# Patient Record
Sex: FEMALE | Race: WHITE | Employment: OTHER | URBAN - METROPOLITAN AREA
[De-identification: names, ages, dates, MRNs, and addresses within clinical notes are randomized per-mention and may not be internally consistent; named-entity substitution may affect disease eponyms.]

---

## 2017-01-04 ENCOUNTER — HOSPITAL ENCOUNTER (OUTPATIENT)
Dept: GENERAL RADIOLOGY | Age: 82
Discharge: HOME | End: 2017-01-04
Attending: FAMILY MEDICINE
Payer: MEDICARE

## 2017-01-04 ENCOUNTER — OFFICE VISIT (OUTPATIENT)
Dept: FAMILY MEDICINE CLINIC | Facility: CLINIC | Age: 82
End: 2017-01-04

## 2017-01-04 VITALS
HEART RATE: 72 BPM | DIASTOLIC BLOOD PRESSURE: 64 MMHG | RESPIRATION RATE: 20 BRPM | BODY MASS INDEX: 32.6 KG/M2 | HEIGHT: 63 IN | TEMPERATURE: 98 F | OXYGEN SATURATION: 96 % | WEIGHT: 184 LBS | SYSTOLIC BLOOD PRESSURE: 100 MMHG

## 2017-01-04 DIAGNOSIS — J45.901 ASTHMA EXACERBATION: ICD-10-CM

## 2017-01-04 DIAGNOSIS — R05.9 COUGH: Primary | ICD-10-CM

## 2017-01-04 DIAGNOSIS — H69.83 EUSTACHIAN TUBE DYSFUNCTION, BILATERAL: ICD-10-CM

## 2017-01-04 DIAGNOSIS — R05.9 COUGH: ICD-10-CM

## 2017-01-04 PROCEDURE — 99214 OFFICE O/P EST MOD 30 MIN: CPT | Performed by: FAMILY MEDICINE

## 2017-01-04 PROCEDURE — 71020 XR CHEST PA + LAT CHEST (CPT=71020): CPT

## 2017-01-04 PROCEDURE — 94640 AIRWAY INHALATION TREATMENT: CPT | Performed by: FAMILY MEDICINE

## 2017-01-04 RX ORDER — AZITHROMYCIN 250 MG/1
TABLET, FILM COATED ORAL
Qty: 6 TABLET | Refills: 0 | Status: SHIPPED | OUTPATIENT
Start: 2017-01-04 | End: 2017-01-09

## 2017-01-04 RX ORDER — PREDNISONE 20 MG/1
40 TABLET ORAL DAILY
Qty: 12 TABLET | Refills: 0 | Status: SHIPPED | OUTPATIENT
Start: 2017-01-04 | End: 2017-01-10

## 2017-01-04 RX ORDER — ALBUTEROL SULFATE 2.5 MG/3ML
2.5 SOLUTION RESPIRATORY (INHALATION) ONCE
Status: COMPLETED | OUTPATIENT
Start: 2017-01-04 | End: 2017-01-04

## 2017-01-04 RX ADMIN — ALBUTEROL SULFATE 2.5 MG: 2.5 SOLUTION RESPIRATORY (INHALATION) at 16:58:00

## 2017-01-04 NOTE — PATIENT INSTRUCTIONS
Start antibiotic today per directions. Start prednisone 2 tablets once a day tomorrow morning. Albuterol inhaler 2 puffs to 3 times per day and as needed for wheezing cough shortness of breath.   Take probiotic over-the-counter daily like organic yogurt w

## 2017-01-04 NOTE — PROGRESS NOTES
Nicola Cantu is a 80year old female. cc cough, congestion  HPI:   Patient is coming for evaluation of the cough which started 2 weeks ago. She has a lot of rumbling in the chest.  She also has nasal congestion runny nose.   Patient denies herself any pr ASPIRIN 81 mg by Does not apply route.  Disp:  Rfl:    FISH OIL 1 TABLET DAILY  Disp:  Rfl:       Past Medical History   Diagnosis Date   • Osteoporosis 2006     2 fractures in lumbar spine, bmd 2006   • Asthma      copd   • Personal history of malignant saturation increased to 96%.   CARDIO: RRR without murmur  GI: good BS's,no masses, HSM or tenderness  EXTREMITIES: no cyanosis, clubbing or edema  Psychiatric - alert  and oriented x3, normal mood     ASSESSMENT AND PLAN:   Cough  (primary encounter diagno

## 2017-01-09 RX ORDER — MEMANTINE HYDROCHLORIDE 10 MG/1
TABLET ORAL
Qty: 180 TABLET | Refills: 0 | Status: SHIPPED | OUTPATIENT
Start: 2017-01-09 | End: 2017-04-09

## 2017-01-16 NOTE — PROGRESS NOTES
Sam Tavarez is a 80year old female. cc asthma exacerbation follow-up, dementia   HPI:   patient is coming for follow-up of asthma exacerbation she is doing much better. She is using her inhalers regularly. No wheezing no shortness of breath.   Her hus 2006     2 fractures in lumbar spine, bmd 2006   • Asthma      copd   • Personal history of malignant neoplasm of breast 1991     on hrt, s/p mastectomy left, chemo   • Uterine prolapse without mention of vaginal wall prolapse      wearing pesary   • Histo of the defined types were placed in this encounter. Meds & Refills for this Visit:  Signed Prescriptions Disp Refills    Donepezil HCl 23 MG Oral Tab 90 tablet 1      Sig: Take 1 tablet (23 mg total) by mouth daily. Continue current meds.    Natalie Farr

## 2017-01-16 NOTE — PATIENT INSTRUCTIONS
Continue current meds. Watch diet for fats and carbs. Stay active. Continue inhalers. Monitor symptoms. Keep good hydration.

## 2017-03-20 ENCOUNTER — OFFICE VISIT (OUTPATIENT)
Dept: FAMILY MEDICINE CLINIC | Facility: CLINIC | Age: 82
End: 2017-03-20

## 2017-03-20 VITALS
OXYGEN SATURATION: 94 % | TEMPERATURE: 97 F | HEIGHT: 63 IN | DIASTOLIC BLOOD PRESSURE: 50 MMHG | HEART RATE: 110 BPM | BODY MASS INDEX: 32.07 KG/M2 | RESPIRATION RATE: 20 BRPM | SYSTOLIC BLOOD PRESSURE: 86 MMHG | WEIGHT: 181 LBS

## 2017-03-20 DIAGNOSIS — J45.30 MILD PERSISTENT ASTHMA WITHOUT COMPLICATION: ICD-10-CM

## 2017-03-20 DIAGNOSIS — M81.0 OSTEOPOROSIS: ICD-10-CM

## 2017-03-20 DIAGNOSIS — Z12.31 ENCOUNTER FOR SCREENING MAMMOGRAM FOR BREAST CANCER: ICD-10-CM

## 2017-03-20 DIAGNOSIS — M25.562 ACUTE PAIN OF LEFT KNEE: ICD-10-CM

## 2017-03-20 DIAGNOSIS — Z00.00 ENCOUNTER FOR ANNUAL HEALTH EXAMINATION: Primary | ICD-10-CM

## 2017-03-20 DIAGNOSIS — F02.80 LATE ONSET ALZHEIMER'S DISEASE WITHOUT BEHAVIORAL DISTURBANCE (HCC): ICD-10-CM

## 2017-03-20 DIAGNOSIS — R39.15 URINARY URGENCY: ICD-10-CM

## 2017-03-20 DIAGNOSIS — E55.9 VITAMIN D DEFICIENCY: ICD-10-CM

## 2017-03-20 DIAGNOSIS — G30.1 LATE ONSET ALZHEIMER'S DISEASE WITHOUT BEHAVIORAL DISTURBANCE (HCC): ICD-10-CM

## 2017-03-20 DIAGNOSIS — I95.0 IDIOPATHIC HYPOTENSION: ICD-10-CM

## 2017-03-20 DIAGNOSIS — R53.1 GENERAL WEAKNESS: ICD-10-CM

## 2017-03-20 DIAGNOSIS — E78.00 PURE HYPERCHOLESTEROLEMIA: ICD-10-CM

## 2017-03-20 PROCEDURE — 99214 OFFICE O/P EST MOD 30 MIN: CPT | Performed by: FAMILY MEDICINE

## 2017-03-20 PROCEDURE — G0444 DEPRESSION SCREEN ANNUAL: HCPCS | Performed by: FAMILY MEDICINE

## 2017-03-20 PROCEDURE — G0439 PPPS, SUBSEQ VISIT: HCPCS | Performed by: FAMILY MEDICINE

## 2017-03-20 RX ORDER — PREDNISONE 20 MG/1
20 TABLET ORAL DAILY
Qty: 5 TABLET | Refills: 0 | Status: SHIPPED | OUTPATIENT
Start: 2017-03-20 | End: 2017-03-25

## 2017-03-20 NOTE — PROGRESS NOTES
HPI:   Joseph Carver is a 80year old female who presents for a Medicare Subsequent Annual Wellness visit (Pt already had Initial Annual Wellness) also follow-up on asthma vitamin D deficiency hyperlipidemia,  discuss her generalized weakness and Alzhe retina     Urgency of urination     Asthma     Unspecified urinary incontinence     Osteoarthrosis, unspecified whether generalized or localized, involving lower leg     Hyperlipidemia     Low BP     Memory loss     Alzheimer's disease     Nonspecific abno needed for Wheezing. EYE-TRINITY PLUS LUTEIN OR 1 TABLET DAILY    CITRACAL + D OR BID      MEDICAL INFORMATION:   She  has a past medical history of Osteoporosis (2006); Asthma; Personal history of malignant neoplasm of breast (1991);  Uterine prolapse witho lb.    Medicare Hearing Assessment  (Required for AWV/SWV)    Finger Rub - normal      Visual Acuity                           General Appearance:  Alert, cooperative, no distress, appears stated age   Head:  Normocephalic, without obvious abnormality, atr examination    Osteoporosis  -     DEXA BONE DENSITY SCAN, ROUTINE (CPT=34569); Future    Vitamin D deficiency  -     VITAMIN D, 25-HYDROXY; Future    Pure hypercholesterolemia  -     COMP METABOLIC PANEL; Future  -     LIPID PANEL;  Future    Late onset Al female age 47-67, do you take aspirin?: Yes    Have you had any immunizations at another office such as Influenza, Hepatitis B, Tetanus, or Pneumococcal?: No     Functional Ability     Bathing or Showering: Able without help    Toileting: Able without help it?: Correct    What year is it?: Correct                      This section provided for quick review of chart, separate sheet to patient  [address] Internal Lab or Procedure External Lab or Procedure   Diabetes Screen performed in visit on 09/12/16  -FLU VACC PRSV FREE INC ANTIG   Orders placed or performed in visit on 09/09/13  -INFLUENZA VIRUS VACCINE, PRESERV FREE, >=1YEARS OF AGE   Orders placed or performed in visit on 09/26/12  -INFLUENZA VIRUS VACCINE, PRESERV F Spirometry Testing Annually Spirometry date:  No flowsheet data found.          Template: MELITON JEFFREY MEDICARE ANNUAL ASSESSMENT FEMALE [21803]

## 2017-03-20 NOTE — PATIENT INSTRUCTIONS
Continue current meds. Watch diet for fats and carbs. Stay active. Schedule physical therapy. Return for fasting blood work. Call [phone] to schedule Mammogram and DEXA scan. Follow-up med check in September 2017.       [facility] screening covered service except at the Welcome to Medicare Visit    Abdominal aortic aneurysm screening (once between ages 73-68) IPPE only No results found for this or any previous visit.  Limited to patients who meet one of the following criteria:   • Me flowsheet data found.     Screening Mammogram      Mammogram    Recommend Annually to at least age 76, and as needed after 76 Mammogram,1 Yr due on 10/05/2016 Please get this Mammogram regularly   Immunizations      Influenza  Covered Annually   Orders plac website. http://[email or web address] [place].state. [place]/public/books/advin.htm  A link to the [employer]. This site has a lot of good information including definitions of the different types of Advance Directives.  It also has the [facility] forms available

## 2017-03-21 ENCOUNTER — APPOINTMENT (OUTPATIENT)
Dept: LAB | Age: 82
End: 2017-03-21
Attending: FAMILY MEDICINE
Payer: MEDICARE

## 2017-03-22 ENCOUNTER — APPOINTMENT (OUTPATIENT)
Dept: LAB | Age: 82
End: 2017-03-22
Attending: FAMILY MEDICINE
Payer: MEDICARE

## 2017-03-22 DIAGNOSIS — R39.15 URINARY URGENCY: ICD-10-CM

## 2017-03-22 PROCEDURE — 87086 URINE CULTURE/COLONY COUNT: CPT

## 2017-03-22 PROCEDURE — 87186 SC STD MICRODIL/AGAR DIL: CPT

## 2017-03-22 PROCEDURE — 87077 CULTURE AEROBIC IDENTIFY: CPT

## 2017-03-24 ENCOUNTER — APPOINTMENT (OUTPATIENT)
Dept: LAB | Age: 82
End: 2017-03-24
Attending: FAMILY MEDICINE
Payer: MEDICARE

## 2017-03-24 DIAGNOSIS — E55.9 VITAMIN D DEFICIENCY: ICD-10-CM

## 2017-03-24 DIAGNOSIS — E78.00 PURE HYPERCHOLESTEROLEMIA: ICD-10-CM

## 2017-03-24 LAB
25-HYDROXYVITAMIN D (TOTAL): 29.6 NG/ML (ref 30–100)
ALBUMIN SERPL-MCNC: 3.6 G/DL (ref 3.5–4.8)
ALP LIVER SERPL-CCNC: 57 U/L (ref 55–142)
ALT SERPL-CCNC: 21 U/L (ref 14–54)
AST SERPL-CCNC: 14 U/L (ref 15–41)
BILIRUB SERPL-MCNC: 0.4 MG/DL (ref 0.1–2)
BUN BLD-MCNC: 21 MG/DL (ref 8–20)
CALCIUM BLD-MCNC: 9.5 MG/DL (ref 8.3–10.3)
CHLORIDE: 106 MMOL/L (ref 101–111)
CHOLEST SMN-MCNC: 171 MG/DL (ref ?–200)
CO2: 28 MMOL/L (ref 22–32)
CREAT BLD-MCNC: 0.99 MG/DL (ref 0.55–1.02)
GLUCOSE BLD-MCNC: 75 MG/DL (ref 70–99)
HDLC SERPL-MCNC: 102 MG/DL (ref 45–?)
HDLC SERPL: 1.68 {RATIO} (ref ?–4.44)
LDLC SERPL CALC-MCNC: 51 MG/DL (ref ?–130)
M PROTEIN MFR SERPL ELPH: 7.1 G/DL (ref 6.1–8.3)
NONHDLC SERPL-MCNC: 69 MG/DL (ref ?–130)
POTASSIUM SERPL-SCNC: 4.1 MMOL/L (ref 3.6–5.1)
SODIUM SERPL-SCNC: 142 MMOL/L (ref 136–144)
TRIGLYCERIDES: 88 MG/DL (ref ?–150)
VLDL: 18 MG/DL (ref 5–40)

## 2017-03-24 PROCEDURE — 82306 VITAMIN D 25 HYDROXY: CPT

## 2017-03-24 PROCEDURE — 36415 COLL VENOUS BLD VENIPUNCTURE: CPT

## 2017-03-24 PROCEDURE — 80061 LIPID PANEL: CPT

## 2017-03-24 PROCEDURE — 80053 COMPREHEN METABOLIC PANEL: CPT

## 2017-03-27 ENCOUNTER — HOSPITAL ENCOUNTER (OUTPATIENT)
Dept: BONE DENSITY | Age: 82
Discharge: HOME | End: 2017-03-27
Attending: FAMILY MEDICINE
Payer: MEDICARE

## 2017-03-27 ENCOUNTER — HOSPITAL ENCOUNTER (OUTPATIENT)
Dept: MAMMOGRAPHY | Age: 82
Discharge: HOME | End: 2017-03-27
Attending: FAMILY MEDICINE
Payer: MEDICARE

## 2017-03-27 DIAGNOSIS — Z12.31 ENCOUNTER FOR SCREENING MAMMOGRAM FOR BREAST CANCER: ICD-10-CM

## 2017-03-27 DIAGNOSIS — M81.0 OSTEOPOROSIS: ICD-10-CM

## 2017-03-27 PROCEDURE — 77080 DXA BONE DENSITY AXIAL: CPT

## 2017-03-27 PROCEDURE — 77067 SCR MAMMO BI INCL CAD: CPT

## 2017-03-27 RX ORDER — SIMVASTATIN 20 MG
TABLET ORAL
Qty: 90 TABLET | Refills: 0 | Status: SHIPPED | OUTPATIENT
Start: 2017-03-27 | End: 2017-06-23

## 2017-03-27 RX ORDER — CEPHALEXIN 500 MG/1
TABLET ORAL
Qty: 21 TABLET | Refills: 0 | Status: SHIPPED | OUTPATIENT
Start: 2017-03-27 | End: 2017-09-20 | Stop reason: ALTCHOICE

## 2017-03-27 RX ORDER — SIMVASTATIN 20 MG
TABLET ORAL
Qty: 90 TABLET | Refills: 0 | Status: SHIPPED | OUTPATIENT
Start: 2017-03-27 | End: 2018-04-25

## 2017-04-03 RX ORDER — MONTELUKAST SODIUM 10 MG/1
TABLET ORAL
Qty: 90 TABLET | Refills: 0 | Status: SHIPPED | OUTPATIENT
Start: 2017-04-03 | End: 2017-06-23

## 2017-04-07 ENCOUNTER — MED REC SCAN ONLY (OUTPATIENT)
Dept: FAMILY MEDICINE CLINIC | Facility: CLINIC | Age: 82
End: 2017-04-07

## 2017-04-11 RX ORDER — MEMANTINE HYDROCHLORIDE 10 MG/1
TABLET ORAL
Qty: 180 TABLET | Refills: 0 | Status: SHIPPED | OUTPATIENT
Start: 2017-04-11 | End: 2017-07-14

## 2017-04-11 NOTE — TELEPHONE ENCOUNTER
Not a protocol medication last OV 3/20/17 last refill 1/9/17, please review and refill if appropriate

## 2017-06-28 RX ORDER — MONTELUKAST SODIUM 10 MG/1
TABLET ORAL
Qty: 90 TABLET | Refills: 0 | Status: SHIPPED | OUTPATIENT
Start: 2017-06-28 | End: 2017-09-15

## 2017-06-28 RX ORDER — SIMVASTATIN 20 MG
TABLET ORAL
Qty: 90 TABLET | Refills: 0 | Status: SHIPPED | OUTPATIENT
Start: 2017-06-28 | End: 2017-09-20

## 2017-07-18 RX ORDER — MEMANTINE HYDROCHLORIDE 10 MG/1
TABLET ORAL
Qty: 180 TABLET | Refills: 0 | Status: SHIPPED | OUTPATIENT
Start: 2017-07-18 | End: 2017-10-20

## 2017-08-05 DIAGNOSIS — G30.1 LATE ONSET ALZHEIMER'S DISEASE WITHOUT BEHAVIORAL DISTURBANCE (HCC): ICD-10-CM

## 2017-08-05 DIAGNOSIS — F02.80 LATE ONSET ALZHEIMER'S DISEASE WITHOUT BEHAVIORAL DISTURBANCE (HCC): ICD-10-CM

## 2017-08-05 RX ORDER — DONEPEZIL HYDROCHLORIDE 23 MG/1
TABLET, FILM COATED ORAL
Qty: 90 TABLET | Refills: 1 | Status: SHIPPED | OUTPATIENT
Start: 2017-08-05 | End: 2018-02-06

## 2017-09-18 RX ORDER — MONTELUKAST SODIUM 10 MG/1
TABLET ORAL
Qty: 90 TABLET | Refills: 0 | Status: SHIPPED | OUTPATIENT
Start: 2017-09-18 | End: 2017-12-28

## 2017-09-20 ENCOUNTER — OFFICE VISIT (OUTPATIENT)
Dept: FAMILY MEDICINE CLINIC | Facility: CLINIC | Age: 82
End: 2017-09-20

## 2017-09-20 VITALS
TEMPERATURE: 97 F | RESPIRATION RATE: 18 BRPM | SYSTOLIC BLOOD PRESSURE: 110 MMHG | WEIGHT: 179 LBS | HEART RATE: 85 BPM | DIASTOLIC BLOOD PRESSURE: 62 MMHG | BODY MASS INDEX: 31.71 KG/M2 | HEIGHT: 63 IN | OXYGEN SATURATION: 97 %

## 2017-09-20 DIAGNOSIS — F02.80 LATE ONSET ALZHEIMER'S DISEASE WITHOUT BEHAVIORAL DISTURBANCE (HCC): ICD-10-CM

## 2017-09-20 DIAGNOSIS — G30.1 LATE ONSET ALZHEIMER'S DISEASE WITHOUT BEHAVIORAL DISTURBANCE (HCC): ICD-10-CM

## 2017-09-20 DIAGNOSIS — R82.90 ABNORMAL URINE ODOR: ICD-10-CM

## 2017-09-20 DIAGNOSIS — E55.9 VITAMIN D DEFICIENCY: ICD-10-CM

## 2017-09-20 DIAGNOSIS — R53.1 GENERALIZED WEAKNESS: ICD-10-CM

## 2017-09-20 DIAGNOSIS — J45.30 MILD PERSISTENT ASTHMA WITHOUT COMPLICATION: ICD-10-CM

## 2017-09-20 DIAGNOSIS — R53.83 FATIGUE, UNSPECIFIED TYPE: ICD-10-CM

## 2017-09-20 DIAGNOSIS — E78.00 PURE HYPERCHOLESTEROLEMIA: Primary | ICD-10-CM

## 2017-09-20 PROCEDURE — 99214 OFFICE O/P EST MOD 30 MIN: CPT | Performed by: FAMILY MEDICINE

## 2017-09-20 PROCEDURE — G0008 ADMIN INFLUENZA VIRUS VAC: HCPCS | Performed by: FAMILY MEDICINE

## 2017-09-20 PROCEDURE — 90653 IIV ADJUVANT VACCINE IM: CPT | Performed by: FAMILY MEDICINE

## 2017-09-20 NOTE — PROGRESS NOTES
Jeanie Fabry is a 80year old female. cc hyperlipidemia, asthma, allergic rhinitis, generalized weakness, urinary odor, obese. Dementia ,   HPI:   Hyperlipidemia on medication doing well.  is trying to limit what she eats.   Her appetite is very Soln Inhale 2 puffs into the lungs every 4 (four) hours as needed for Wheezing. Disp:  Rfl:    loratadine (CLARITIN) 10 MG Oral Tab Take 10 mg by mouth daily. Disp:  Rfl:    ASPIRIN 81 mg by Does not apply route.  Disp:  Rfl:    FISH OIL 1 TABLET DAILY  Dis supple,no adenopathy,  LUNGS: clear to auscultation  CARDIO: RRR without murmur  GI: good BS's,no masses, HSM or tenderness  EXTREMITIES: no cyanosis, clubbing or edema  Psychiatric - alert  and oriented to herself, normal mood     ASSESSMENT AND PLAN:   P

## 2017-09-20 NOTE — PATIENT INSTRUCTIONS
Continue current medications. Use facial moisturizer like Aveeno. Return for fasting blood work. Schedule  physical therapy. Healthy diet. Keep good hydration. Schedule Medicare wellness visit after March 20, 2018. Follow-up sooner if needed .

## 2017-09-21 ENCOUNTER — APPOINTMENT (OUTPATIENT)
Dept: LAB | Age: 82
End: 2017-09-21
Attending: FAMILY MEDICINE
Payer: MEDICARE

## 2017-09-21 DIAGNOSIS — R82.90 ABNORMAL URINE ODOR: ICD-10-CM

## 2017-09-21 PROCEDURE — 87086 URINE CULTURE/COLONY COUNT: CPT

## 2017-09-25 RX ORDER — SIMVASTATIN 20 MG
TABLET ORAL
Qty: 90 TABLET | Refills: 0 | Status: SHIPPED | OUTPATIENT
Start: 2017-09-25 | End: 2017-12-22

## 2017-09-26 ENCOUNTER — LABORATORY ENCOUNTER (OUTPATIENT)
Dept: LAB | Age: 82
End: 2017-09-26
Attending: FAMILY MEDICINE
Payer: MEDICARE

## 2017-09-26 DIAGNOSIS — E55.9 VITAMIN D DEFICIENCY: ICD-10-CM

## 2017-09-26 DIAGNOSIS — R53.83 FATIGUE, UNSPECIFIED TYPE: ICD-10-CM

## 2017-09-26 DIAGNOSIS — E78.00 PURE HYPERCHOLESTEROLEMIA: ICD-10-CM

## 2017-09-26 LAB
25-HYDROXYVITAMIN D (TOTAL): 26.3 NG/ML (ref 30–100)
ALBUMIN SERPL-MCNC: 3.5 G/DL (ref 3.5–4.8)
ALP LIVER SERPL-CCNC: 65 U/L (ref 55–142)
ALT SERPL-CCNC: 22 U/L (ref 14–54)
AST SERPL-CCNC: 14 U/L (ref 15–41)
BASOPHILS # BLD AUTO: 0.06 X10(3) UL (ref 0–0.1)
BASOPHILS NFR BLD AUTO: 0.8 %
BILIRUB SERPL-MCNC: 0.6 MG/DL (ref 0.1–2)
BUN BLD-MCNC: 22 MG/DL (ref 8–20)
CALCIUM BLD-MCNC: 9.4 MG/DL (ref 8.3–10.3)
CHLORIDE: 108 MMOL/L (ref 101–111)
CHOLEST SMN-MCNC: 175 MG/DL (ref ?–200)
CO2: 28 MMOL/L (ref 22–32)
CREAT BLD-MCNC: 0.94 MG/DL (ref 0.55–1.02)
EOSINOPHIL # BLD AUTO: 1.23 X10(3) UL (ref 0–0.3)
EOSINOPHIL NFR BLD AUTO: 16.3 %
ERYTHROCYTE [DISTWIDTH] IN BLOOD BY AUTOMATED COUNT: 14.7 % (ref 11.5–16)
GLUCOSE BLD-MCNC: 77 MG/DL (ref 70–99)
HCT VFR BLD AUTO: 41.6 % (ref 34–50)
HDLC SERPL-MCNC: 108 MG/DL (ref 45–?)
HDLC SERPL: 1.62 {RATIO} (ref ?–4.44)
HGB BLD-MCNC: 13.1 G/DL (ref 12–16)
IMMATURE GRANULOCYTE COUNT: 0.01 X10(3) UL (ref 0–1)
IMMATURE GRANULOCYTE RATIO %: 0.1 %
LDLC SERPL CALC-MCNC: 49 MG/DL (ref ?–130)
LDLC SERPL-MCNC: 18 MG/DL (ref 5–40)
LYMPHOCYTES # BLD AUTO: 3 X10(3) UL (ref 0.9–4)
LYMPHOCYTES NFR BLD AUTO: 39.7 %
M PROTEIN MFR SERPL ELPH: 7.4 G/DL (ref 6.1–8.3)
MCH RBC QN AUTO: 29.9 PG (ref 27–33.2)
MCHC RBC AUTO-ENTMCNC: 31.5 G/DL (ref 31–37)
MCV RBC AUTO: 95 FL (ref 81–100)
MONOCYTES # BLD AUTO: 0.81 X10(3) UL (ref 0.1–0.6)
MONOCYTES NFR BLD AUTO: 10.7 %
NEUTROPHIL ABS PRELIM: 2.44 X10 (3) UL (ref 1.3–6.7)
NEUTROPHILS # BLD AUTO: 2.44 X10(3) UL (ref 1.3–6.7)
NEUTROPHILS NFR BLD AUTO: 32.4 %
NONHDLC SERPL-MCNC: 67 MG/DL (ref ?–130)
PLATELET # BLD AUTO: 157 10(3)UL (ref 150–450)
POTASSIUM SERPL-SCNC: 3.9 MMOL/L (ref 3.6–5.1)
RBC # BLD AUTO: 4.38 X10(6)UL (ref 3.8–5.1)
RED CELL DISTRIBUTION WIDTH-SD: 51.4 FL (ref 35.1–46.3)
SODIUM SERPL-SCNC: 144 MMOL/L (ref 136–144)
TRIGLYCERIDES: 90 MG/DL (ref ?–150)
TSI SER-ACNC: 1.47 MIU/ML (ref 0.35–5.5)
WBC # BLD AUTO: 7.6 X10(3) UL (ref 4–13)

## 2017-09-26 PROCEDURE — 80061 LIPID PANEL: CPT

## 2017-09-26 PROCEDURE — 84443 ASSAY THYROID STIM HORMONE: CPT

## 2017-09-26 PROCEDURE — 85025 COMPLETE CBC W/AUTO DIFF WBC: CPT

## 2017-09-26 PROCEDURE — 36415 COLL VENOUS BLD VENIPUNCTURE: CPT

## 2017-09-26 PROCEDURE — 80053 COMPREHEN METABOLIC PANEL: CPT

## 2017-09-26 PROCEDURE — 82306 VITAMIN D 25 HYDROXY: CPT

## 2017-09-27 DIAGNOSIS — E78.00 PURE HYPERCHOLESTEROLEMIA: Primary | ICD-10-CM

## 2017-09-27 DIAGNOSIS — R73.9 HYPERGLYCEMIA: ICD-10-CM

## 2017-09-27 DIAGNOSIS — E55.9 VITAMIN D DEFICIENCY: ICD-10-CM

## 2017-10-20 RX ORDER — MEMANTINE HYDROCHLORIDE 10 MG/1
TABLET ORAL
Qty: 180 TABLET | Refills: 0 | Status: SHIPPED | OUTPATIENT
Start: 2017-10-20 | End: 2018-01-22

## 2017-12-22 ENCOUNTER — OFFICE VISIT (OUTPATIENT)
Dept: FAMILY MEDICINE CLINIC | Facility: CLINIC | Age: 82
End: 2017-12-22

## 2017-12-22 ENCOUNTER — HOSPITAL ENCOUNTER (OUTPATIENT)
Dept: GENERAL RADIOLOGY | Age: 82
Discharge: HOME | End: 2017-12-22
Attending: FAMILY MEDICINE
Payer: MEDICARE

## 2017-12-22 ENCOUNTER — TELEPHONE (OUTPATIENT)
Dept: FAMILY MEDICINE CLINIC | Facility: CLINIC | Age: 82
End: 2017-12-22

## 2017-12-22 VITALS
SYSTOLIC BLOOD PRESSURE: 112 MMHG | WEIGHT: 179 LBS | OXYGEN SATURATION: 94 % | DIASTOLIC BLOOD PRESSURE: 86 MMHG | HEIGHT: 63 IN | RESPIRATION RATE: 18 BRPM | TEMPERATURE: 97 F | HEART RATE: 79 BPM | BODY MASS INDEX: 31.71 KG/M2

## 2017-12-22 DIAGNOSIS — R05.9 COUGH: ICD-10-CM

## 2017-12-22 DIAGNOSIS — G30.1 LATE ONSET ALZHEIMER'S DISEASE WITHOUT BEHAVIORAL DISTURBANCE (HCC): ICD-10-CM

## 2017-12-22 DIAGNOSIS — R09.02 HYPOXIA: ICD-10-CM

## 2017-12-22 DIAGNOSIS — R06.2 WHEEZES: ICD-10-CM

## 2017-12-22 DIAGNOSIS — J18.9 PNEUMONIA OF BOTH LOWER LOBES DUE TO INFECTIOUS ORGANISM: Primary | ICD-10-CM

## 2017-12-22 DIAGNOSIS — J45.41 MODERATE PERSISTENT ASTHMA WITH EXACERBATION: ICD-10-CM

## 2017-12-22 DIAGNOSIS — F02.80 LATE ONSET ALZHEIMER'S DISEASE WITHOUT BEHAVIORAL DISTURBANCE (HCC): ICD-10-CM

## 2017-12-22 DIAGNOSIS — R06.02 SHORTNESS OF BREATH: ICD-10-CM

## 2017-12-22 DIAGNOSIS — J45.31 MILD PERSISTENT ASTHMA WITH ACUTE EXACERBATION: ICD-10-CM

## 2017-12-22 PROCEDURE — 94640 AIRWAY INHALATION TREATMENT: CPT | Performed by: FAMILY MEDICINE

## 2017-12-22 PROCEDURE — 71020 XR CHEST PA + LAT CHEST (CPT=71020): CPT | Performed by: FAMILY MEDICINE

## 2017-12-22 PROCEDURE — 99215 OFFICE O/P EST HI 40 MIN: CPT | Performed by: FAMILY MEDICINE

## 2017-12-22 PROCEDURE — 96372 THER/PROPH/DIAG INJ SC/IM: CPT | Performed by: FAMILY MEDICINE

## 2017-12-22 RX ORDER — ALBUTEROL SULFATE 2.5 MG/3ML
2.5 SOLUTION RESPIRATORY (INHALATION) EVERY 4 HOURS PRN
Qty: 75 AMPULE | Refills: 1 | Status: SHIPPED | OUTPATIENT
Start: 2017-12-22

## 2017-12-22 RX ORDER — LEVOFLOXACIN 750 MG/1
750 TABLET ORAL DAILY
Qty: 10 TABLET | Refills: 0 | Status: SHIPPED | OUTPATIENT
Start: 2017-12-22 | End: 2018-03-21 | Stop reason: ALTCHOICE

## 2017-12-22 RX ORDER — METHYLPREDNISOLONE SODIUM SUCCINATE 125 MG/2ML
125 INJECTION, POWDER, LYOPHILIZED, FOR SOLUTION INTRAMUSCULAR; INTRAVENOUS ONCE
Status: DISCONTINUED | OUTPATIENT
Start: 2017-12-22 | End: 2017-12-22

## 2017-12-22 RX ORDER — PREDNISONE 50 MG/1
50 TABLET ORAL DAILY
Qty: 6 TABLET | Refills: 0 | Status: SHIPPED | OUTPATIENT
Start: 2017-12-22 | End: 2018-03-21 | Stop reason: ALTCHOICE

## 2017-12-22 RX ORDER — METHYLPREDNISOLONE SODIUM SUCCINATE 125 MG/2ML
125 INJECTION, POWDER, LYOPHILIZED, FOR SOLUTION INTRAMUSCULAR; INTRAVENOUS ONCE
Status: COMPLETED | OUTPATIENT
Start: 2017-12-22 | End: 2017-12-22

## 2017-12-22 RX ORDER — ALBUTEROL SULFATE 2.5 MG/3ML
2.5 SOLUTION RESPIRATORY (INHALATION) ONCE
Status: COMPLETED | OUTPATIENT
Start: 2017-12-22 | End: 2017-12-22

## 2017-12-22 RX ADMIN — METHYLPREDNISOLONE SODIUM SUCCINATE 125 MG: 125 INJECTION, POWDER, LYOPHILIZED, FOR SOLUTION INTRAMUSCULAR; INTRAVENOUS at 16:29:00

## 2017-12-22 RX ADMIN — ALBUTEROL SULFATE 2.5 MG: 2.5 SOLUTION RESPIRATORY (INHALATION) at 14:48:00

## 2017-12-22 NOTE — PATIENT INSTRUCTIONS
Start prednisone 50 mg 1 tablet daily tomorrow AM.   Do Chest  x-ray. Albuterol nebulizer use 3 times per day and as needed for shortness of breath or wheezing. You can use it every 4 hours as needed. Continue current inhalers.   Take probiotic like org

## 2017-12-22 NOTE — TELEPHONE ENCOUNTER
Spoke with hattie/spouse-he signed hipaa consent but is otherwise not listed. sts pt has had couple wk hx of chest congestion and cough producing clear phlegm.  \"sometimes no cough at all and other times coughs to the point of gagging or waking her from sl

## 2017-12-22 NOTE — PROGRESS NOTES
Natalio Soriano is a 80year old female. cc cough congestion shortness of breath  HPI:   Patient is brought by her . She has Alzheimer's disease. The history is taking by talking to her .   Completely depends on her  with everything du every 4 (four) hours as needed for Wheezing. Disp:  Rfl:    loratadine (CLARITIN) 10 MG Oral Tab Take 10 mg by mouth daily. Disp:  Rfl:    ASPIRIN 81 mg by Does not apply route.  Disp:  Rfl:    FISH OIL 1 TABLET DAILY  Disp:  Rfl:    EYE-TRINITY PLUS LUTEIN OR Breastfeeding?  No   BMI 31.71 kg/m²   GENERAL: well developed, well nourished,  patient is audibly wheezing here in the office congested in the chest, here with her   SKIN: no rashes,no suspicious lesions  HEENT: atraumatic, normocephalic,ears -mild 1      Sig: Take 3 mL (2.5 mg total) by nebulization every 4 (four) hours as needed for Wheezing. predniSONE 50 MG Oral Tab 6 tablet 0      Sig: Take 1 tablet (50 mg total) by mouth daily.        Start prednisone 50 mg 1 tablet daily tomorrow AM.   Charli Zarate

## 2017-12-22 NOTE — TELEPHONE ENCOUNTER
1258 call back to hattie/spouse-advised of 's appt offer. He sts they can be to ofc in 15-20 min. appt scheduled.

## 2017-12-28 RX ORDER — MONTELUKAST SODIUM 10 MG/1
TABLET ORAL
Qty: 90 TABLET | Refills: 0 | Status: SHIPPED | OUTPATIENT
Start: 2017-12-28 | End: 2018-03-04

## 2017-12-28 RX ORDER — SIMVASTATIN 20 MG
TABLET ORAL
Qty: 90 TABLET | Refills: 0 | Status: SHIPPED | OUTPATIENT
Start: 2017-12-28 | End: 2018-03-21 | Stop reason: ALTCHOICE

## 2018-01-23 RX ORDER — MEMANTINE HYDROCHLORIDE 10 MG/1
TABLET ORAL
Qty: 180 TABLET | Refills: 0 | Status: SHIPPED | OUTPATIENT
Start: 2018-01-23 | End: 2018-04-21

## 2018-01-23 NOTE — TELEPHONE ENCOUNTER
Not protocol medication. LOV :9/20/17 med check   Last labs done :9/26/17  Next appointment :9/26/17   Please see pending medication. Refill if appropriate.    Last refill:    Date:10/20/17  Amount 180 tablets no refill   Medication: memantine hcl 10 mg

## 2018-02-06 DIAGNOSIS — G30.1 LATE ONSET ALZHEIMER'S DISEASE WITHOUT BEHAVIORAL DISTURBANCE (HCC): ICD-10-CM

## 2018-02-06 DIAGNOSIS — F02.80 LATE ONSET ALZHEIMER'S DISEASE WITHOUT BEHAVIORAL DISTURBANCE (HCC): ICD-10-CM

## 2018-02-08 RX ORDER — DONEPEZIL HYDROCHLORIDE 23 MG/1
TABLET, FILM COATED ORAL
Qty: 90 TABLET | Refills: 1 | Status: SHIPPED | OUTPATIENT
Start: 2018-02-08 | End: 2018-08-02

## 2018-02-08 NOTE — TELEPHONE ENCOUNTER
DONEPEZIL HCL 23MG TAB    The patient last OV was on 09/20/2017. Rx is pending for your approval.    Please sign,    Thank you    Next appt is on 03/21/2048.

## 2018-03-05 RX ORDER — MONTELUKAST SODIUM 10 MG/1
TABLET ORAL
Qty: 90 TABLET | Refills: 0 | Status: SHIPPED | OUTPATIENT
Start: 2018-03-05 | End: 2018-06-28

## 2018-03-06 ENCOUNTER — TELEPHONE (OUTPATIENT)
Dept: FAMILY MEDICINE CLINIC | Facility: CLINIC | Age: 83
End: 2018-03-06

## 2018-03-06 NOTE — TELEPHONE ENCOUNTER
dropped off a parking placard form for pt to be completed. Once completed, please mail to [place] [facility] (address on bottom of 2nd page). Form put in Dr. Tejal Watkins in box.

## 2018-03-21 ENCOUNTER — HOSPITAL ENCOUNTER (OUTPATIENT)
Dept: GENERAL RADIOLOGY | Age: 83
Discharge: HOME | End: 2018-03-21
Attending: FAMILY MEDICINE
Payer: MEDICARE

## 2018-03-21 ENCOUNTER — LAB ENCOUNTER (OUTPATIENT)
Dept: LAB | Age: 83
End: 2018-03-21
Attending: FAMILY MEDICINE
Payer: MEDICARE

## 2018-03-21 ENCOUNTER — OFFICE VISIT (OUTPATIENT)
Dept: FAMILY MEDICINE CLINIC | Facility: CLINIC | Age: 83
End: 2018-03-21

## 2018-03-21 VITALS
RESPIRATION RATE: 20 BRPM | WEIGHT: 170 LBS | DIASTOLIC BLOOD PRESSURE: 65 MMHG | HEART RATE: 103 BPM | HEIGHT: 63 IN | OXYGEN SATURATION: 93 % | SYSTOLIC BLOOD PRESSURE: 92 MMHG | TEMPERATURE: 97 F | BODY MASS INDEX: 30.12 KG/M2

## 2018-03-21 DIAGNOSIS — G30.1 LATE ONSET ALZHEIMER'S DISEASE WITHOUT BEHAVIORAL DISTURBANCE (HCC): ICD-10-CM

## 2018-03-21 DIAGNOSIS — R00.0 TACHYCARDIA: ICD-10-CM

## 2018-03-21 DIAGNOSIS — R09.89 CHEST CONGESTION: ICD-10-CM

## 2018-03-21 DIAGNOSIS — J30.89 NON-SEASONAL ALLERGIC RHINITIS DUE TO OTHER ALLERGIC TRIGGER: ICD-10-CM

## 2018-03-21 DIAGNOSIS — R06.2 WHEEZING: ICD-10-CM

## 2018-03-21 DIAGNOSIS — J45.901 PERSISTENT ASTHMA WITH ACUTE EXACERBATION, UNSPECIFIED ASTHMA SEVERITY: ICD-10-CM

## 2018-03-21 DIAGNOSIS — E55.9 VITAMIN D DEFICIENCY: ICD-10-CM

## 2018-03-21 DIAGNOSIS — E78.00 PURE HYPERCHOLESTEROLEMIA: ICD-10-CM

## 2018-03-21 DIAGNOSIS — R39.15 URGENCY OF URINATION: ICD-10-CM

## 2018-03-21 DIAGNOSIS — M81.0 AGE-RELATED OSTEOPOROSIS WITHOUT CURRENT PATHOLOGICAL FRACTURE: ICD-10-CM

## 2018-03-21 DIAGNOSIS — Z00.00 ENCOUNTER FOR ANNUAL HEALTH EXAMINATION: Primary | ICD-10-CM

## 2018-03-21 DIAGNOSIS — F02.80 LATE ONSET ALZHEIMER'S DISEASE WITHOUT BEHAVIORAL DISTURBANCE (HCC): ICD-10-CM

## 2018-03-21 LAB
ALBUMIN SERPL-MCNC: 3.6 G/DL (ref 3.5–4.8)
ALP LIVER SERPL-CCNC: 66 U/L (ref 55–142)
ALT SERPL-CCNC: 19 U/L (ref 14–54)
AST SERPL-CCNC: 21 U/L (ref 15–41)
BASOPHILS # BLD AUTO: 0.06 X10(3) UL (ref 0–0.1)
BASOPHILS NFR BLD AUTO: 0.9 %
BETA NATRIURETIC PEPTIDE: 39 PG/ML (ref 2–99)
BILIRUB SERPL-MCNC: 0.3 MG/DL (ref 0.1–2)
BUN BLD-MCNC: 21 MG/DL (ref 8–20)
CALCIUM BLD-MCNC: 9.7 MG/DL (ref 8.3–10.3)
CHLORIDE: 107 MMOL/L (ref 101–111)
CO2: 29 MMOL/L (ref 22–32)
CREAT BLD-MCNC: 0.99 MG/DL (ref 0.55–1.02)
EOSINOPHIL # BLD AUTO: 1.13 X10(3) UL (ref 0–0.3)
EOSINOPHIL NFR BLD AUTO: 17.5 %
ERYTHROCYTE [DISTWIDTH] IN BLOOD BY AUTOMATED COUNT: 14.5 % (ref 11.5–16)
GLUCOSE BLD-MCNC: 93 MG/DL (ref 70–99)
HCT VFR BLD AUTO: 46 % (ref 34–50)
HGB BLD-MCNC: 14.2 G/DL (ref 12–16)
IMMATURE GRANULOCYTE COUNT: 0.01 X10(3) UL (ref 0–1)
IMMATURE GRANULOCYTE RATIO %: 0.2 %
LYMPHOCYTES # BLD AUTO: 1.93 X10(3) UL (ref 0.9–4)
LYMPHOCYTES NFR BLD AUTO: 30 %
M PROTEIN MFR SERPL ELPH: 7.7 G/DL (ref 6.1–8.3)
MCH RBC QN AUTO: 29 PG (ref 27–33.2)
MCHC RBC AUTO-ENTMCNC: 30.9 G/DL (ref 31–37)
MCV RBC AUTO: 94.1 FL (ref 81–100)
MONOCYTES # BLD AUTO: 0.6 X10(3) UL (ref 0.1–1)
MONOCYTES NFR BLD AUTO: 9.3 %
NEUTROPHIL ABS PRELIM: 2.71 X10 (3) UL (ref 1.3–6.7)
NEUTROPHILS # BLD AUTO: 2.71 X10(3) UL (ref 1.3–6.7)
NEUTROPHILS NFR BLD AUTO: 42.1 %
PLATELET # BLD AUTO: 164 10(3)UL (ref 150–450)
POTASSIUM SERPL-SCNC: 4.2 MMOL/L (ref 3.6–5.1)
RBC # BLD AUTO: 4.89 X10(6)UL (ref 3.8–5.1)
RED CELL DISTRIBUTION WIDTH-SD: 49.9 FL (ref 35.1–46.3)
SODIUM SERPL-SCNC: 141 MMOL/L (ref 136–144)
WBC # BLD AUTO: 6.4 X10(3) UL (ref 4–13)

## 2018-03-21 PROCEDURE — G0439 PPPS, SUBSEQ VISIT: HCPCS | Performed by: FAMILY MEDICINE

## 2018-03-21 PROCEDURE — 80053 COMPREHEN METABOLIC PANEL: CPT

## 2018-03-21 PROCEDURE — 93000 ELECTROCARDIOGRAM COMPLETE: CPT | Performed by: FAMILY MEDICINE

## 2018-03-21 PROCEDURE — 96372 THER/PROPH/DIAG INJ SC/IM: CPT | Performed by: FAMILY MEDICINE

## 2018-03-21 PROCEDURE — 85025 COMPLETE CBC W/AUTO DIFF WBC: CPT

## 2018-03-21 PROCEDURE — 36415 COLL VENOUS BLD VENIPUNCTURE: CPT

## 2018-03-21 PROCEDURE — 83880 ASSAY OF NATRIURETIC PEPTIDE: CPT

## 2018-03-21 PROCEDURE — 99215 OFFICE O/P EST HI 40 MIN: CPT | Performed by: FAMILY MEDICINE

## 2018-03-21 PROCEDURE — 71046 X-RAY EXAM CHEST 2 VIEWS: CPT | Performed by: FAMILY MEDICINE

## 2018-03-21 PROCEDURE — G0444 DEPRESSION SCREEN ANNUAL: HCPCS | Performed by: FAMILY MEDICINE

## 2018-03-21 PROCEDURE — 94640 AIRWAY INHALATION TREATMENT: CPT | Performed by: FAMILY MEDICINE

## 2018-03-21 RX ORDER — PREDNISONE 10 MG/1
TABLET ORAL
Qty: 42 TABLET | Refills: 0 | Status: SHIPPED | OUTPATIENT
Start: 2018-03-21 | End: 2018-04-25 | Stop reason: ALTCHOICE

## 2018-03-21 RX ORDER — CEFUROXIME AXETIL 500 MG/1
500 TABLET ORAL 2 TIMES DAILY
Qty: 20 TABLET | Refills: 0 | Status: SHIPPED | OUTPATIENT
Start: 2018-03-21 | End: 2018-09-08

## 2018-03-21 RX ORDER — ALBUTEROL SULFATE 2.5 MG/3ML
2.5 SOLUTION RESPIRATORY (INHALATION) ONCE
Status: COMPLETED | OUTPATIENT
Start: 2018-03-21 | End: 2018-03-21

## 2018-03-21 RX ORDER — METHYLPREDNISOLONE SODIUM SUCCINATE 125 MG/2ML
125 INJECTION, POWDER, LYOPHILIZED, FOR SOLUTION INTRAMUSCULAR; INTRAVENOUS ONCE
Status: COMPLETED | OUTPATIENT
Start: 2018-03-21 | End: 2018-03-21

## 2018-03-21 RX ADMIN — ALBUTEROL SULFATE 2.5 MG: 2.5 SOLUTION RESPIRATORY (INHALATION) at 14:06:00

## 2018-03-21 RX ADMIN — METHYLPREDNISOLONE SODIUM SUCCINATE 125 MG: 125 INJECTION, POWDER, LYOPHILIZED, FOR SOLUTION INTRAMUSCULAR; INTRAVENOUS at 14:33:00

## 2018-03-21 NOTE — PATIENT INSTRUCTIONS
Do chest x-ray today. We will call you with results and make further determination about treatment after that. Continue current inhalers. Use AeroChamber with mask with it.     Tiarra Macias's SCREENING SCHEDULE   Tests on this list are recommended b for medical reasons Electrocardiogram date Routine EKG is not a screening covered service except at the [place] to Medicare Visit    Abdominal aortic aneurysm screening (once between ages 73-68) IPPE only No results found for this or any previous visit.  Jennifer Briones this patient. Chlamydia  Annually if high risk No results found for: CHLAMYDIA No flowsheet data found.     Screening Mammogram      Mammogram    Recommend Annually to at least age 76, and as needed after 76 There are no preventive care reminders to dis Directives    [email or web address] org/publications/[email or web address]. pdf  An information packet, including necessary form from the [employer] website. http://www. [place]state. [place]/public/books/advin.htm  A link to the [place]

## 2018-03-21 NOTE — PROGRESS NOTES
HPI:   Madison Pakrer is a 80year old female who presents for a Medicare Subsequent Annual Wellness visit (Pt already had Initial Annual Wellness) still complaining of having cough chest congestion, asthma, dementia, hyperlipidemia, allergic rhinitis, recommended by the [facility]PSTF and we discussed options, see handouts.     Do you have 3 or more medical conditions?: 1-Yes  Have you fallen in the last 12 months?: 0-No  Do you accidently lose urine?: 1-Yes  Do you have difficulty seeing?: 1-Yes  Do you have an functional status. Shop for groceries: Cannot do without help   She has difficulties Taking Meds as Rx'd based on screening of functional status.    Taking medications as prescribed: Cannot do without help        She has Vision problems based on screening Advanced Directive:   She does have a Living Will but we do NOT have it on file in [address] [place] [address] Rd. The patient has this document but we do not have it in [facility], and patient is instructed to get our office a copy of it for scanning into Epic.          She does Lab Results  Component Value Date   CHOLEST 175 09/26/2017    09/26/2017   LDL 49 09/26/2017   TRIG 90 09/26/2017          Last Chemistry Labs:     Lab Results  Component Value Date   AST 14 (L) 09/26/2017   ALT 22 09/26/2017   CA 9.4 09/26/2017 OR 1 TABLET DAILY    CITRACAL + D OR BID      MEDICAL INFORMATION:   She  has a past medical history of Asthma; Breast CA ([facility] [address] Dementia; Extrinsic asthma, unspecified; History of bone density study (10/22/10); Osteoporosis (2006);  Other and unspecified h this encounter: 170 lb. Medicare Hearing Assessment  (Required for AWV/SWV)    Finger Rub -normal bilaterally per patient.        Visual Acuity                           General Appearance:  Alert, cooperative, no distress, appears stated age, she is her 09/01/2012        ASSESSMENT AND OTHER RELEVANT CHRONIC CONDITIONS:   Pee Grullon is a 80year old female who presents for a Medicare Assessment.      PLAN SUMMARY:   Diagnoses and all orders for this visit:    Encounter for annual health examination A. fib, it showed slightly fast heart rate 92. Sinus tachycardia no acute ST changes. Blood work ordered. Chest congestion x-ray ordered to rule out pneumonia there was no pneumonia asthma changes.   Will also do the BNP to rule out possible fluid over PA + LAT CHEST (CPT=71020), 12/22/2017, 14:45. TECHNIQUE:  PA and lateral chest radiographs were obtained.      PATIENT STATED HISTORY: (As transcribed by Technologist)  Chest  Persistent asthma with acute exacerbation  Cough for past several weeks (mg/dL)   Date Value   09/06/2007 141 (H)     LDL CHOLESTROL (mg/dL)   Date Value   05/01/2014 53     LDL Cholesterol (mg/dL)   Date Value   09/26/2017 49        EKG - w/ Initial Preventative Physical Exam only, or if medically necessary Electrocardiogram institutions for the mentally retarded   Persons who live in the same house as a HepB virus carrier   Homosexual men   Illicit injectable drug abusers     Tetanus Toxoid  Only covered with a cut with metal- TD and TDaP Not covered by Medicare Part B No vac

## 2018-03-23 ENCOUNTER — OFFICE VISIT (OUTPATIENT)
Dept: FAMILY MEDICINE CLINIC | Facility: CLINIC | Age: 83
End: 2018-03-23

## 2018-03-23 VITALS
HEIGHT: 63 IN | WEIGHT: 170 LBS | SYSTOLIC BLOOD PRESSURE: 102 MMHG | HEART RATE: 86 BPM | OXYGEN SATURATION: 96 % | TEMPERATURE: 97 F | DIASTOLIC BLOOD PRESSURE: 72 MMHG | RESPIRATION RATE: 18 BRPM | BODY MASS INDEX: 30.12 KG/M2

## 2018-03-23 DIAGNOSIS — R05.9 COUGH: ICD-10-CM

## 2018-03-23 DIAGNOSIS — J45.31 MILD PERSISTENT ASTHMA WITH ACUTE EXACERBATION: Primary | ICD-10-CM

## 2018-03-23 PROCEDURE — 99213 OFFICE O/P EST LOW 20 MIN: CPT | Performed by: FAMILY MEDICINE

## 2018-03-23 NOTE — PROGRESS NOTES
Sarita Cortes is a 80year old female. cc asthma exacerbation follow-up,  HPI:   She is coming for follow-up of asthma exacerbation. She is doing much better per her  coughing less. Less chest congestion. Still tired.   She is doing nebulizers wh for Wheezing. Disp:  Rfl:    loratadine (CLARITIN) 10 MG Oral Tab Take 10 mg by mouth daily. Disp:  Rfl:    ASPIRIN 81 mg by Does not apply route.  Disp:  Rfl:    FISH OIL 1 TABLET DAILY  Disp:  Rfl:    EYE-TRINITY PLUS LUTEIN OR 1 TABLET DAILY  Disp:  Rfl: adenopathy,  LUNGS: Coarse breath sounds with rhonchi, there is  very scant wheeze anterior chest no crackles. CARDIO: RRR without murmur  GI: good BS's,no masses, HSM or tenderness  EXTREMITIES: no cyanosis, clubbing or edema  Active. Cooperative.    colleen

## 2018-03-23 NOTE — PATIENT INSTRUCTIONS
Finish course of prednisone. Finish course of antibiotic. Continue nebulizers 3 times per day for the next week then use as needed for cough shortness of breath wheezing. Continue other inhalers. Do fasting blood work in 1 month before next visit.

## 2018-04-02 RX ORDER — SIMVASTATIN 20 MG
TABLET ORAL
Qty: 90 TABLET | Refills: 1 | Status: SHIPPED | OUTPATIENT
Start: 2018-04-02 | End: 2018-10-11

## 2018-04-19 ENCOUNTER — APPOINTMENT (OUTPATIENT)
Dept: LAB | Age: 83
End: 2018-04-19
Attending: FAMILY MEDICINE
Payer: MEDICARE

## 2018-04-19 DIAGNOSIS — E78.00 PURE HYPERCHOLESTEROLEMIA: ICD-10-CM

## 2018-04-19 DIAGNOSIS — E55.9 VITAMIN D DEFICIENCY: ICD-10-CM

## 2018-04-19 DIAGNOSIS — R73.9 HYPERGLYCEMIA: ICD-10-CM

## 2018-04-19 PROCEDURE — 80053 COMPREHEN METABOLIC PANEL: CPT

## 2018-04-19 PROCEDURE — 82306 VITAMIN D 25 HYDROXY: CPT

## 2018-04-19 PROCEDURE — 36415 COLL VENOUS BLD VENIPUNCTURE: CPT

## 2018-04-19 PROCEDURE — 83036 HEMOGLOBIN GLYCOSYLATED A1C: CPT

## 2018-04-19 PROCEDURE — 80061 LIPID PANEL: CPT

## 2018-04-23 RX ORDER — MEMANTINE HYDROCHLORIDE 10 MG/1
TABLET ORAL
Qty: 180 TABLET | Refills: 1 | Status: SHIPPED | OUTPATIENT
Start: 2018-04-23 | End: 2019-02-10

## 2018-04-23 NOTE — TELEPHONE ENCOUNTER
MEMANTINE 10MG TAB    Not a per protocol medication.     Rx is pending for your approval.    Please sign,    Thank you

## 2018-04-25 ENCOUNTER — OFFICE VISIT (OUTPATIENT)
Dept: FAMILY MEDICINE CLINIC | Facility: CLINIC | Age: 83
End: 2018-04-25

## 2018-04-25 VITALS
BODY MASS INDEX: 32.78 KG/M2 | HEIGHT: 63 IN | OXYGEN SATURATION: 97 % | HEART RATE: 97 BPM | SYSTOLIC BLOOD PRESSURE: 104 MMHG | DIASTOLIC BLOOD PRESSURE: 64 MMHG | RESPIRATION RATE: 18 BRPM | TEMPERATURE: 98 F | WEIGHT: 185 LBS

## 2018-04-25 DIAGNOSIS — E78.00 PURE HYPERCHOLESTEROLEMIA: ICD-10-CM

## 2018-04-25 DIAGNOSIS — M81.0 AGE-RELATED OSTEOPOROSIS WITHOUT CURRENT PATHOLOGICAL FRACTURE: ICD-10-CM

## 2018-04-25 DIAGNOSIS — R26.89 BALANCE PROBLEM: ICD-10-CM

## 2018-04-25 DIAGNOSIS — R53.1 GENERAL WEAKNESS: ICD-10-CM

## 2018-04-25 DIAGNOSIS — R32 URINARY INCONTINENCE, UNSPECIFIED TYPE: Primary | ICD-10-CM

## 2018-04-25 DIAGNOSIS — E55.9 VITAMIN D DEFICIENCY: ICD-10-CM

## 2018-04-25 DIAGNOSIS — J45.30 MILD PERSISTENT ASTHMA WITHOUT COMPLICATION: ICD-10-CM

## 2018-04-25 DIAGNOSIS — J30.89 NON-SEASONAL ALLERGIC RHINITIS DUE TO OTHER ALLERGIC TRIGGER: ICD-10-CM

## 2018-04-25 PROCEDURE — 99214 OFFICE O/P EST MOD 30 MIN: CPT | Performed by: FAMILY MEDICINE

## 2018-04-25 RX ORDER — TOLTERODINE 4 MG/1
CAPSULE, EXTENDED RELEASE ORAL
Qty: 30 CAPSULE | Refills: 1 | Status: SHIPPED | OUTPATIENT
Start: 2018-04-25 | End: 2018-06-14

## 2018-04-25 NOTE — PATIENT INSTRUCTIONS
Continue current meds. Watch diet for fats and carbs. Stay active. Patient physical therapy at [facility]. Start Detrol LA 4 mg 1 tablet daily for urinary problems. Bring urine sample for cultures before starting Detrol.

## 2018-04-25 NOTE — PROGRESS NOTES
Pee Grullon is a 80year old female. cc asthma, hyperlipidemia, dementia, urinary incontinence, balance problem, generalized weakness,  HPI:   Patient is coming for follow-up visit. She is having a diagnosis of asthma. Right now she is doing good.    daily. Disp:  Rfl:    albuterol sulfate (2.5 MG/3ML) 0.083% Inhalation Nebu Soln Take 3 mL (2.5 mg total) by nebulization every 4 (four) hours as needed for Wheezing.  Disp: 75 ampule Rfl: 1   Albuterol Sulfate HFA (VENTOLIN) 108 (90 BASE) MCG/ACT Inhalatio murmur  GI: good BS's,no masses, HSM or tenderness  Musculoskeletal patient is using walker to ambulate. Leaning forward on the walker.   EXTREMITIES: no cyanosis, clubbing or edema  Psychiatric - alert , confused to the day/ time, here with her  and

## 2018-04-26 ENCOUNTER — MED REC SCAN ONLY (OUTPATIENT)
Dept: FAMILY MEDICINE CLINIC | Facility: CLINIC | Age: 83
End: 2018-04-26

## 2018-04-30 ENCOUNTER — APPOINTMENT (OUTPATIENT)
Dept: LAB | Age: 83
End: 2018-04-30
Attending: FAMILY MEDICINE
Payer: MEDICARE

## 2018-04-30 DIAGNOSIS — R32 URINARY INCONTINENCE, UNSPECIFIED TYPE: ICD-10-CM

## 2018-04-30 PROCEDURE — 87086 URINE CULTURE/COLONY COUNT: CPT

## 2018-06-18 RX ORDER — TOLTERODINE 4 MG/1
CAPSULE, EXTENDED RELEASE ORAL
Qty: 30 CAPSULE | Refills: 2 | Status: SHIPPED | OUTPATIENT
Start: 2018-06-18 | End: 2018-09-18

## 2018-06-18 NOTE — TELEPHONE ENCOUNTER
Not protocol medication. LOV :4/25/18 medication follow up   Last labs done :4/19/18  Next appointment :9/13/18  Please see pending medication. Refill if appropriate.    Last refill:    Date:4/25/18  Amount :30 capsules 1 refill   Medication: tolterodine

## 2018-06-29 RX ORDER — MONTELUKAST SODIUM 10 MG/1
TABLET ORAL
Qty: 90 TABLET | Refills: 0 | Status: SHIPPED | OUTPATIENT
Start: 2018-06-29 | End: 2018-09-24

## 2018-06-29 NOTE — TELEPHONE ENCOUNTER
MONTELUKAST 10MG TAB    Asthma & COPD Medication Protocol Failed    The patient does not have a AAP or ACT due to her  Being ill on her last few visit.     Rx is pending for your approval.    Please sign,     Thank you

## 2018-07-25 ENCOUNTER — OFFICE VISIT (OUTPATIENT)
Dept: FAMILY MEDICINE CLINIC | Facility: CLINIC | Age: 83
End: 2018-07-25
Payer: MEDICARE

## 2018-07-25 VITALS
OXYGEN SATURATION: 95 % | SYSTOLIC BLOOD PRESSURE: 96 MMHG | HEIGHT: 63 IN | WEIGHT: 180 LBS | BODY MASS INDEX: 31.89 KG/M2 | HEART RATE: 78 BPM | DIASTOLIC BLOOD PRESSURE: 56 MMHG | RESPIRATION RATE: 18 BRPM | TEMPERATURE: 97 F

## 2018-07-25 DIAGNOSIS — M17.0 PRIMARY OSTEOARTHRITIS OF BOTH KNEES: ICD-10-CM

## 2018-07-25 DIAGNOSIS — H10.32 ACUTE BACTERIAL CONJUNCTIVITIS OF LEFT EYE: Primary | ICD-10-CM

## 2018-07-25 DIAGNOSIS — N39.41 URGE INCONTINENCE OF URINE: ICD-10-CM

## 2018-07-25 DIAGNOSIS — R26.89 BALANCE PROBLEM: ICD-10-CM

## 2018-07-25 PROCEDURE — 99214 OFFICE O/P EST MOD 30 MIN: CPT | Performed by: FAMILY MEDICINE

## 2018-07-25 RX ORDER — TOBRAMYCIN 3 MG/ML
2 SOLUTION/ DROPS OPHTHALMIC EVERY 4 HOURS
Qty: 5 ML | Refills: 0 | Status: ON HOLD | OUTPATIENT
Start: 2018-07-25 | End: 2019-03-08

## 2018-07-25 RX ORDER — SOLIFENACIN SUCCINATE 5 MG/1
5 TABLET, FILM COATED ORAL DAILY
Qty: 30 TABLET | Refills: 0 | Status: SHIPPED | OUTPATIENT
Start: 2018-07-25 | End: 2018-09-08

## 2018-07-25 NOTE — PATIENT INSTRUCTIONS
Use tobramycin eyedrops 1-2 drops to left eye every 4 hours for 5-7 days until resolution of the symptoms. Warm compresses. Try Vesicare 1 tablet daily do not use Detrol at the same time. Good hydration.

## 2018-07-25 NOTE — PROGRESS NOTES
Beverley Ghotra is a 80year old female. CC redness of the eye, osteoarthritis reduced mobility, incontinence  HPI:   Patient is brought To the office for evaluation of the redness of the left eye. She has it for over one week. She is touching her eye. puff into the lungs 2 (two) times daily.  Disp: 3 Inhaler Rfl: 1   DONEPEZIL HCL 23 MG Oral Tab TAKE ONE TABLET BY MOUTH ONCE DAILY Disp: 90 tablet Rfl: 1   albuterol sulfate (2.5 MG/3ML) 0.083% Inhalation Nebu Soln Take 3 mL (2.5 mg total) by nebulization pain on exertion  GI: denies abdominal pain and denies heartburn  NEURO: denies headaches  Psych normal mood.     EXAM:   BP 96/56 (BP Location: Right arm, Patient Position: Sitting, Cuff Size: adult)   Pulse 78   Temp 97.3 °F (36.3 °C) (Oral)   Resp 18   H

## 2018-08-02 DIAGNOSIS — F02.80 LATE ONSET ALZHEIMER'S DISEASE WITHOUT BEHAVIORAL DISTURBANCE (HCC): ICD-10-CM

## 2018-08-02 DIAGNOSIS — G30.1 LATE ONSET ALZHEIMER'S DISEASE WITHOUT BEHAVIORAL DISTURBANCE (HCC): ICD-10-CM

## 2018-08-02 RX ORDER — DONEPEZIL HYDROCHLORIDE 23 MG/1
TABLET, FILM COATED ORAL
Qty: 90 TABLET | Refills: 0 | Status: SHIPPED | OUTPATIENT
Start: 2018-08-02 | End: 2018-11-09

## 2018-08-02 NOTE — TELEPHONE ENCOUNTER
DONEPEZIL HCL 23MG TAB    Not a per protocol medication. Rx is pending for your approval.    Please sign,     Thank you    The patient last OV was on 07/25/2018. Her last refill was on 02/08/2018 for 90/1 refills.

## 2018-08-06 DIAGNOSIS — F02.80 LATE ONSET ALZHEIMER'S DISEASE WITHOUT BEHAVIORAL DISTURBANCE (HCC): ICD-10-CM

## 2018-08-06 DIAGNOSIS — G30.1 LATE ONSET ALZHEIMER'S DISEASE WITHOUT BEHAVIORAL DISTURBANCE (HCC): ICD-10-CM

## 2018-08-06 DIAGNOSIS — M17.0 PRIMARY OSTEOARTHRITIS OF BOTH KNEES: Primary | ICD-10-CM

## 2018-09-08 ENCOUNTER — HOSPITAL ENCOUNTER (OUTPATIENT)
Dept: GENERAL RADIOLOGY | Age: 83
Discharge: HOME | End: 2018-09-08
Attending: FAMILY MEDICINE
Payer: MEDICARE

## 2018-09-08 ENCOUNTER — HOSPITAL ENCOUNTER (OUTPATIENT)
Dept: ULTRASOUND IMAGING | Facility: HOSPITAL | Age: 83
Discharge: HOME | End: 2018-09-08
Attending: FAMILY MEDICINE
Payer: MEDICARE

## 2018-09-08 ENCOUNTER — OFFICE VISIT (OUTPATIENT)
Dept: FAMILY MEDICINE CLINIC | Facility: CLINIC | Age: 83
End: 2018-09-08
Payer: MEDICARE

## 2018-09-08 VITALS
WEIGHT: 177.75 LBS | OXYGEN SATURATION: 96 % | BODY MASS INDEX: 31.5 KG/M2 | HEIGHT: 63 IN | RESPIRATION RATE: 18 BRPM | TEMPERATURE: 97 F | SYSTOLIC BLOOD PRESSURE: 108 MMHG | HEART RATE: 60 BPM | DIASTOLIC BLOOD PRESSURE: 70 MMHG

## 2018-09-08 DIAGNOSIS — R60.0 LEG EDEMA, RIGHT: ICD-10-CM

## 2018-09-08 DIAGNOSIS — J45.901 PERSISTENT ASTHMA WITH ACUTE EXACERBATION, UNSPECIFIED ASTHMA SEVERITY: ICD-10-CM

## 2018-09-08 DIAGNOSIS — R09.89 CHEST CONGESTION: ICD-10-CM

## 2018-09-08 DIAGNOSIS — R60.0 LEG EDEMA, RIGHT: Primary | ICD-10-CM

## 2018-09-08 PROCEDURE — 71046 X-RAY EXAM CHEST 2 VIEWS: CPT | Performed by: FAMILY MEDICINE

## 2018-09-08 PROCEDURE — 99214 OFFICE O/P EST MOD 30 MIN: CPT | Performed by: FAMILY MEDICINE

## 2018-09-08 PROCEDURE — 93971 EXTREMITY STUDY: CPT | Performed by: FAMILY MEDICINE

## 2018-09-08 RX ORDER — PREDNISONE 10 MG/1
TABLET ORAL
Qty: 42 TABLET | Refills: 0 | Status: SHIPPED | OUTPATIENT
Start: 2018-09-08 | End: 2018-09-28 | Stop reason: ALTCHOICE

## 2018-09-08 RX ORDER — CEFUROXIME AXETIL 500 MG/1
500 TABLET ORAL 2 TIMES DAILY
Qty: 20 TABLET | Refills: 0 | Status: SHIPPED | OUTPATIENT
Start: 2018-09-08 | End: 2018-09-18

## 2018-09-08 NOTE — PROGRESS NOTES
Bishop De Jesus is a 80year old female. cc cough, chest congestion, history of asthma  HPI:   Patient is coming to the office with complaint of cough and chest congestion for 2 weeks. Does not get better.   Patient has late onset Alzheimer's dementia histo CAPSULE BY MOUTH ONCE DAILY Disp: 30 capsule Rfl: 2   MEMANTINE HCL 10 MG Oral Tab TAKE ONE TABLET BY MOUTH TWICE DAILY (Patient taking differently: TAKE ONE TABLET BY MOUTH ONCE DAILY) Disp: 180 tablet Rfl: 1   SIMVASTATIN 20 MG Oral Tab TAKE ONE TABLET B Alcohol use: No      Alcohol/week: 0.0 oz    Drug use: No       REVIEW OF SYSTEMS:   GENERAL HEALTH: feels well otherwise  SKIN: denies any unusual skin lesions or rashes  HEENT nasal congestion , runny nose no sore throat  Neck no neck pain  RESPIRATORY: tablets for 3 days, then 2 tablets for 2 days, the 1 tablet for 2 days and stop. • Cefuroxime Axetil (CEFTIN) 500 MG Oral Tab 20 tablet 0     Sig: Take 1 tablet (500 mg total) by mouth 2 (two) times daily for 10 days.      Start antibiotic today per direc atelectasis/scarring in the lower lungs. Dictated by: Yosef Herrera MD on 9/08/2018 at 12:13       Approved by: Yosef Herrera MD         The patient indicates understanding of these issues and agrees to the plan.   The patient is asked to r

## 2018-09-08 NOTE — PATIENT INSTRUCTIONS
Start antibiotic today per directions 1 tablet twice a day. Start prednisone and taper down per directions. Albuterol nebulizers 1 neb every try to do 3 times per day it could be given every 4-6 hours as needed. ,   Take probiotic over-the-counter daily l

## 2018-09-20 RX ORDER — TOLTERODINE 4 MG/1
CAPSULE, EXTENDED RELEASE ORAL
Qty: 90 CAPSULE | Refills: 0 | Status: SHIPPED | OUTPATIENT
Start: 2018-09-20 | End: 2018-12-19

## 2018-09-20 NOTE — TELEPHONE ENCOUNTER
TOLTERODINE ER 4MG CAP    Not a protocol medication. Please see pended medications. Please sign if appropriate.       Thank you

## 2018-09-26 NOTE — TELEPHONE ENCOUNTER
Pt's spouse answered the phone call and I stated to have pt call us back. Spouse wanted to know what this is about since pt has dementia.  I checked on HIPPAA verbal consent and spouse is not on the list. Ricardo Klein the daughter is on the list.

## 2018-09-27 RX ORDER — MONTELUKAST SODIUM 10 MG/1
TABLET ORAL
Qty: 90 TABLET | Refills: 0 | Status: SHIPPED | OUTPATIENT
Start: 2018-09-27 | End: 2018-12-26

## 2018-09-27 NOTE — TELEPHONE ENCOUNTER
MONTELUKAST 10MG TAB  Asthma & COPD Medication Protocol Failed    The pt made an appt for 09/28/18. Please see pended medications. Please sign if appropriate.       Thank you

## 2018-09-28 ENCOUNTER — OFFICE VISIT (OUTPATIENT)
Dept: FAMILY MEDICINE CLINIC | Facility: CLINIC | Age: 83
End: 2018-09-28
Payer: MEDICARE

## 2018-09-28 VITALS
DIASTOLIC BLOOD PRESSURE: 62 MMHG | OXYGEN SATURATION: 98 % | HEART RATE: 80 BPM | TEMPERATURE: 97 F | RESPIRATION RATE: 18 BRPM | BODY MASS INDEX: 31.36 KG/M2 | HEIGHT: 63 IN | SYSTOLIC BLOOD PRESSURE: 126 MMHG | WEIGHT: 177 LBS

## 2018-09-28 DIAGNOSIS — J45.31 MILD PERSISTENT ASTHMA WITH ACUTE EXACERBATION: Primary | ICD-10-CM

## 2018-09-28 DIAGNOSIS — E78.00 HYPERCHOLESTEREMIA: ICD-10-CM

## 2018-09-28 DIAGNOSIS — E55.9 VITAMIN D DEFICIENCY: ICD-10-CM

## 2018-09-28 DIAGNOSIS — R05.9 COUGH: ICD-10-CM

## 2018-09-28 DIAGNOSIS — R73.9 HYPERGLYCEMIA: ICD-10-CM

## 2018-09-28 DIAGNOSIS — R60.0 EDEMA OF BOTH LEGS: ICD-10-CM

## 2018-09-28 DIAGNOSIS — F02.80 LATE ONSET ALZHEIMER'S DISEASE WITHOUT BEHAVIORAL DISTURBANCE (HCC): ICD-10-CM

## 2018-09-28 DIAGNOSIS — G30.1 LATE ONSET ALZHEIMER'S DISEASE WITHOUT BEHAVIORAL DISTURBANCE (HCC): ICD-10-CM

## 2018-09-28 PROCEDURE — G0008 ADMIN INFLUENZA VIRUS VAC: HCPCS | Performed by: FAMILY MEDICINE

## 2018-09-28 PROCEDURE — 99213 OFFICE O/P EST LOW 20 MIN: CPT | Performed by: FAMILY MEDICINE

## 2018-09-28 PROCEDURE — 90653 IIV ADJUVANT VACCINE IM: CPT | Performed by: FAMILY MEDICINE

## 2018-09-28 RX ORDER — TOLTERODINE 4 MG/1
4 CAPSULE, EXTENDED RELEASE ORAL
COMMUNITY
Start: 2009-07-08 | End: 2018-10-17

## 2018-09-28 RX ORDER — MONTELUKAST SODIUM 10 MG/1
TABLET ORAL
COMMUNITY
End: 2018-10-17

## 2018-09-28 RX ORDER — FLUTICASONE PROPIONATE AND SALMETEROL 250; 50 UG/1; UG/1
1 POWDER RESPIRATORY (INHALATION) 2 TIMES DAILY
Status: ON HOLD | COMMUNITY
End: 2019-03-08

## 2018-09-28 RX ORDER — CHLORAL HYDRATE 500 MG
CAPSULE ORAL DAILY
COMMUNITY

## 2018-09-28 NOTE — PROGRESS NOTES
Joseph Smoker is a 80year old female. CC asthma exacerbation cough follow-up  HPI:   Patient is coming for follow-up of asthma Exacerbation. Doing Much Better No Coughing Anymore. No Wheezing Does Not Feel That She StruggLES to Breathe.   Patient Has En hours as needed for Wheezing. Disp:  Rfl:    loratadine (CLARITIN) 10 MG Oral Tab Take 10 mg by mouth daily. Disp:  Rfl:    ASPIRIN 81 mg by Does not apply route.  Disp:  Rfl:    FISH OIL 1 TABLET DAILY  Disp:  Rfl:    EYE-TRINITY PLUS LUTEIN OR 1 TABLET DAILY Pulse 80   Temp 96.9 °F (36.1 °C) (Oral)   Resp 18   Ht 63\"   Wt 177 lb   SpO2 98%   Breastfeeding? No   BMI 31.35 kg/m²   GENERAL: well developed, well nourished,in no apparent distress, she is here with her  history taken by talking to him.   Magda

## 2018-09-28 NOTE — PATIENT INSTRUCTIONS
Continue current inhalers. Elevate legs. Keep good hydration. Do blood work before next office visit.

## 2018-10-11 RX ORDER — SIMVASTATIN 20 MG
TABLET ORAL
Qty: 90 TABLET | Refills: 0 | Status: SHIPPED | OUTPATIENT
Start: 2018-10-11 | End: 2019-01-02

## 2018-10-17 ENCOUNTER — TELEPHONE (OUTPATIENT)
Dept: FAMILY MEDICINE CLINIC | Facility: CLINIC | Age: 83
End: 2018-10-17

## 2018-10-17 ENCOUNTER — HOSPITAL ENCOUNTER (OUTPATIENT)
Dept: GENERAL RADIOLOGY | Age: 83
Discharge: HOME | End: 2018-10-17
Attending: FAMILY MEDICINE
Payer: MEDICARE

## 2018-10-17 ENCOUNTER — OFFICE VISIT (OUTPATIENT)
Dept: FAMILY MEDICINE CLINIC | Facility: CLINIC | Age: 83
End: 2018-10-17
Payer: MEDICARE

## 2018-10-17 VITALS
HEART RATE: 94 BPM | SYSTOLIC BLOOD PRESSURE: 110 MMHG | RESPIRATION RATE: 18 BRPM | TEMPERATURE: 97 F | WEIGHT: 180 LBS | OXYGEN SATURATION: 94 % | BODY MASS INDEX: 31.89 KG/M2 | HEIGHT: 63 IN | DIASTOLIC BLOOD PRESSURE: 68 MMHG

## 2018-10-17 DIAGNOSIS — R05.9 COUGH: ICD-10-CM

## 2018-10-17 DIAGNOSIS — J45.31 MILD PERSISTENT ASTHMA WITH ACUTE EXACERBATION: ICD-10-CM

## 2018-10-17 DIAGNOSIS — F02.81 LATE ONSET ALZHEIMER'S DISEASE WITH BEHAVIORAL DISTURBANCE (HCC): ICD-10-CM

## 2018-10-17 DIAGNOSIS — G30.1 LATE ONSET ALZHEIMER'S DISEASE WITH BEHAVIORAL DISTURBANCE (HCC): ICD-10-CM

## 2018-10-17 DIAGNOSIS — R05.9 COUGH: Primary | ICD-10-CM

## 2018-10-17 PROCEDURE — 71046 X-RAY EXAM CHEST 2 VIEWS: CPT | Performed by: FAMILY MEDICINE

## 2018-10-17 PROCEDURE — 99214 OFFICE O/P EST MOD 30 MIN: CPT | Performed by: FAMILY MEDICINE

## 2018-10-17 PROCEDURE — 94640 AIRWAY INHALATION TREATMENT: CPT | Performed by: FAMILY MEDICINE

## 2018-10-17 RX ORDER — PREDNISONE 10 MG/1
TABLET ORAL
Qty: 42 TABLET | Refills: 0 | Status: ON HOLD | OUTPATIENT
Start: 2018-10-17 | End: 2019-03-10

## 2018-10-17 RX ORDER — ALBUTEROL SULFATE 2.5 MG/3ML
2.5 SOLUTION RESPIRATORY (INHALATION) ONCE
Status: COMPLETED | OUTPATIENT
Start: 2018-10-17 | End: 2018-10-17

## 2018-10-17 RX ORDER — TRIAMCINOLONE ACETONIDE 55 UG/1
1 SPRAY, METERED NASAL DAILY
COMMUNITY
End: 2020-02-24

## 2018-10-17 RX ORDER — AZITHROMYCIN 250 MG/1
TABLET, FILM COATED ORAL
Qty: 6 TABLET | Refills: 0 | Status: SHIPPED | OUTPATIENT
Start: 2018-10-17 | End: 2018-10-25 | Stop reason: ALTCHOICE

## 2018-10-17 RX ADMIN — ALBUTEROL SULFATE 2.5 MG: 2.5 SOLUTION RESPIRATORY (INHALATION) at 12:32:00

## 2018-10-17 NOTE — TELEPHONE ENCOUNTER
Spoke with the pt  Osiris Joshi and he was informed of his wife chest x-ray results. He will bring his wife in on 10/25/2018 @ noon.

## 2018-10-17 NOTE — PATIENT INSTRUCTIONS
Do chest x-ray. Start  Antibiotic and take per directions. Start prednisone 5 tablets on the first day and then taper down per directions. Probiotic daily like organic yogurt. Continue Flovent inhaler 1 puff twice a day use a spacer and mask for that.

## 2018-10-17 NOTE — TELEPHONE ENCOUNTER
Central Line Procedure


REASON FOR PROCEDURE


Central venous access





PROCEDURE PERFORMED


Central line placement: [right femoral vein ]





CONSENT


emergency procedure





ANESTHESIA


Local injection of 1% Lidocaine





DESCRIPTION OF THE PROCEDURE


The patient was placed in supine position. Right femoral area was exposed and 

cleansed with ChloraPrep, times two.  Large sterile drape was used to cover the 

patient, with the site exposed, under sterile conditions including cap, face 

mask, sterile gown, and sterile gloves.  On single attempt, the introducer 

needle was inserted with negative pressure in syringe and venous flash was 

obtained.  The guide wire was then advanced without any restriction and the 

needle was removed.  The dilator was used without any complications.  Using 

Seldinger technique the [cordis ] catheter was advanced over the guide wire to 

a depth of [ ] centimeters.  The guide wire was removed.  All ports were 

aspirated with dark venous blood return and flushed easily with sterile saline.

  All ports were capped.  Antibiotic disc was placed around central line at 

puncture site.  The central line was secured to the skin with two interrupted 

2.0 silk sutures.  The area was bandaged with sterile see-through central line 

bandage.





RADIOLOGICAL DATA


landmark technique.





COMPLICATIONS:


No apparent complications





ESTIMATED BLOOD LOSS:


Less than 1 cc.











Sruthi Plummer MD Nov 4, 2017 15:43 Left a message on the pt  and daughter cell phone regarding seeing if they can bring in Palhais on 10/25/2018 at 12 noon? If so please placed her on Dr. Jovon Barreto schedule.     Thank you

## 2018-10-18 NOTE — PROGRESS NOTES
Natalio Soriano is a 80year old female. cc cough, chest congestion  HPI:   Patient is coming for evaluation of the cough and chest congestion initially patient was seen in the office at the end of September.   Her symptoms got better after course of steroid DAILY (Patient taking differently: TAKE ONE TABLET BY MOUTH ONCE DAILY) Disp: 180 tablet Rfl: 1   Fluticasone Propionate HFA (FLOVENT HFA) 220 MCG/ACT Inhalation Aerosol Inhale 1 puff into the lungs 2 (two) times daily.  Disp: 3 Inhaler Rfl: 1   albuterol s exertion  CARDIOVASCULAR: denies chest pain on exertion  GI: denies abdominal pain and denies heartburn  NEURO: denies headaches   psych normal mood  Extremities edema of the legs    EXAM:   /68 (BP Location: Right arm, Patient Position: Sitting, Cuf that.  Use albuterol as needed. Follow-up in 1 week. Call sooner if worsening symptoms at any point. Imaging & Consults:  None  Chest x-ray  was reviewed no pneumonia. The patient indicates understanding of these issues and agrees to the plan.   The p

## 2018-10-25 ENCOUNTER — OFFICE VISIT (OUTPATIENT)
Dept: FAMILY MEDICINE CLINIC | Facility: CLINIC | Age: 83
End: 2018-10-25
Payer: MEDICARE

## 2018-10-25 VITALS
HEIGHT: 63 IN | SYSTOLIC BLOOD PRESSURE: 112 MMHG | BODY MASS INDEX: 31.89 KG/M2 | TEMPERATURE: 97 F | WEIGHT: 180 LBS | DIASTOLIC BLOOD PRESSURE: 64 MMHG | OXYGEN SATURATION: 96 % | RESPIRATION RATE: 20 BRPM | HEART RATE: 86 BPM

## 2018-10-25 DIAGNOSIS — F02.80 LATE ONSET ALZHEIMER'S DISEASE WITHOUT BEHAVIORAL DISTURBANCE (HCC): ICD-10-CM

## 2018-10-25 DIAGNOSIS — R09.81 NASAL CONGESTION: ICD-10-CM

## 2018-10-25 DIAGNOSIS — G30.1 LATE ONSET ALZHEIMER'S DISEASE WITHOUT BEHAVIORAL DISTURBANCE (HCC): ICD-10-CM

## 2018-10-25 DIAGNOSIS — J45.31 MILD PERSISTENT ASTHMA WITH ACUTE EXACERBATION: Primary | ICD-10-CM

## 2018-10-25 PROCEDURE — 99213 OFFICE O/P EST LOW 20 MIN: CPT | Performed by: FAMILY MEDICINE

## 2018-10-25 RX ORDER — IPRATROPIUM BROMIDE 21 UG/1
2 SPRAY, METERED NASAL EVERY 12 HOURS
Qty: 30 ML | Refills: 2 | Status: SHIPPED | OUTPATIENT
Start: 2018-10-25 | End: 2019-09-27

## 2018-10-25 NOTE — PROGRESS NOTES
Jeanie Fabry is a 80year old female. cc asthma follow-up, nasal congestion  HPI:   Patient is coming for follow-up of asthma. She is here with her daughter Namrata Parish. Patient is doing much better. Still having some cough but congestion is much improved. ONCE DAILY) Disp: 180 tablet Rfl: 1   Fluticasone Propionate HFA (FLOVENT HFA) 220 MCG/ACT Inhalation Aerosol Inhale 1 puff into the lungs 2 (two) times daily.  Disp: 3 Inhaler Rfl: 1   albuterol sulfate (2.5 MG/3ML) 0.083% Inhalation Nebu Soln Take 3 mL (2 exertion  GI: denies abdominal pain and denies heartburn  NEURO: denies headaches  Psych normal mood.     EXAM:   /64 (BP Location: Left arm, Patient Position: Sitting, Cuff Size: adult)   Pulse 86   Temp 96.9 °F (36.1 °C) (Oral)   Resp 20   Ht 63\"

## 2018-10-26 ENCOUNTER — LAB ENCOUNTER (OUTPATIENT)
Dept: LAB | Age: 83
End: 2018-10-26
Attending: FAMILY MEDICINE
Payer: MEDICARE

## 2018-10-26 DIAGNOSIS — G30.1 LATE ONSET ALZHEIMER'S DISEASE WITHOUT BEHAVIORAL DISTURBANCE (HCC): ICD-10-CM

## 2018-10-26 DIAGNOSIS — E55.9 VITAMIN D DEFICIENCY: ICD-10-CM

## 2018-10-26 DIAGNOSIS — E78.00 HYPERCHOLESTEREMIA: ICD-10-CM

## 2018-10-26 DIAGNOSIS — R73.9 HYPERGLYCEMIA: ICD-10-CM

## 2018-10-26 DIAGNOSIS — R60.0 EDEMA OF BOTH LEGS: ICD-10-CM

## 2018-10-26 DIAGNOSIS — F02.80 LATE ONSET ALZHEIMER'S DISEASE WITHOUT BEHAVIORAL DISTURBANCE (HCC): ICD-10-CM

## 2018-10-26 PROCEDURE — 83036 HEMOGLOBIN GLYCOSYLATED A1C: CPT

## 2018-10-26 PROCEDURE — 84443 ASSAY THYROID STIM HORMONE: CPT

## 2018-10-26 PROCEDURE — 82607 VITAMIN B-12: CPT

## 2018-10-26 PROCEDURE — 82306 VITAMIN D 25 HYDROXY: CPT

## 2018-10-26 PROCEDURE — 80061 LIPID PANEL: CPT

## 2018-10-26 PROCEDURE — 36415 COLL VENOUS BLD VENIPUNCTURE: CPT

## 2018-10-26 PROCEDURE — 80053 COMPREHEN METABOLIC PANEL: CPT

## 2018-10-26 PROCEDURE — 81001 URINALYSIS AUTO W/SCOPE: CPT

## 2018-10-26 PROCEDURE — 85025 COMPLETE CBC W/AUTO DIFF WBC: CPT

## 2018-10-26 NOTE — PATIENT INSTRUCTIONS
Continue using Flovent 2 sprays twice a day with using AeroChamber. Try Atrovent nasal spray. Continue Flonase. Monitor symptoms. Albuterol nebulizer only as needed.

## 2018-10-31 ENCOUNTER — OFFICE VISIT (OUTPATIENT)
Dept: FAMILY MEDICINE CLINIC | Facility: CLINIC | Age: 83
End: 2018-10-31
Payer: MEDICARE

## 2018-10-31 VITALS
RESPIRATION RATE: 18 BRPM | SYSTOLIC BLOOD PRESSURE: 116 MMHG | TEMPERATURE: 97 F | WEIGHT: 180 LBS | OXYGEN SATURATION: 98 % | BODY MASS INDEX: 31.89 KG/M2 | HEIGHT: 63 IN | DIASTOLIC BLOOD PRESSURE: 70 MMHG | HEART RATE: 80 BPM

## 2018-10-31 DIAGNOSIS — J30.89 NON-SEASONAL ALLERGIC RHINITIS DUE TO OTHER ALLERGIC TRIGGER: ICD-10-CM

## 2018-10-31 DIAGNOSIS — R73.9 HYPERGLYCEMIA: ICD-10-CM

## 2018-10-31 DIAGNOSIS — E78.00 HYPERCHOLESTEREMIA: ICD-10-CM

## 2018-10-31 DIAGNOSIS — N39.41 URGE INCONTINENCE OF URINE: ICD-10-CM

## 2018-10-31 DIAGNOSIS — R09.81 NASAL CONGESTION: ICD-10-CM

## 2018-10-31 DIAGNOSIS — M81.0 AGE-RELATED OSTEOPOROSIS WITHOUT CURRENT PATHOLOGICAL FRACTURE: ICD-10-CM

## 2018-10-31 DIAGNOSIS — F02.80 LATE ONSET ALZHEIMER'S DISEASE WITHOUT BEHAVIORAL DISTURBANCE (HCC): ICD-10-CM

## 2018-10-31 DIAGNOSIS — G30.1 LATE ONSET ALZHEIMER'S DISEASE WITHOUT BEHAVIORAL DISTURBANCE (HCC): ICD-10-CM

## 2018-10-31 DIAGNOSIS — E55.9 VITAMIN D DEFICIENCY: Primary | ICD-10-CM

## 2018-10-31 DIAGNOSIS — E55.9 VITAMIN D DEFICIENCY: ICD-10-CM

## 2018-10-31 DIAGNOSIS — J45.30 MILD PERSISTENT ASTHMA WITHOUT COMPLICATION: Primary | ICD-10-CM

## 2018-10-31 PROCEDURE — 99214 OFFICE O/P EST MOD 30 MIN: CPT | Performed by: FAMILY MEDICINE

## 2018-10-31 RX ORDER — ERGOCALCIFEROL 1.25 MG/1
50000 CAPSULE ORAL WEEKLY
Qty: 12 CAPSULE | Refills: 0 | Status: SHIPPED | OUTPATIENT
Start: 2018-10-31 | End: 2018-11-30

## 2018-10-31 NOTE — PROGRESS NOTES
Pee Grullon is a 80year old female. cc hyperlipidemia, asthma, allergic rhinitis, urgency incontinence, Alzheimer's dementia, hyperglycemia , nasal congestion  HPI:   Hyperlipidemia on medication doing well.  is trying to limit what she eats. lungs 2 (two) times daily.  Disp:  Rfl:    omega-3 fatty acids 1000 MG Oral Cap Daily Disp:  Rfl:    MONTELUKAST SODIUM 10 MG Oral Tab TAKE 1 TABLET BY MOUTH ONCE DAILY IN THE EVENING Disp: 90 tablet Rfl: 0   TOLTERODINE TARTRATE ER 4 MG Oral Capsule SR 24 Social History:  Social History    Tobacco Use      Smoking status: Former Smoker        Packs/day: 0.50        Years: 50.00        Pack years: 25        Types: Cigarettes, Cigars      Smokeless tobacco: Never Used      Tobacco comment: 30-40 years ago Alkaline Phosphatase 48 (L) 55 - 142 U/L    Bilirubin, Total 0.5 0.1 - 2.0 mg/dL    Total Protein 6.7 6.4 - 8.2 g/dL    Albumin 3.2 3.1 - 4.5 g/dL    Globulin  3.5 2.8 - 4.4 g/dL    A/G Ratio 0.9 (L) 1.0 - 2.0   LIPID PANEL    Collection Time: 10/26/18 Collection Time: 10/26/18  2:27 PM   Result Value Ref Range    Urine Color Yellow Yellow    Clarity Urine Cloudy (A) Clear    Spec Gravity 1.014 1.001 - 1.030    Glucose Urine Negative Negative mg/dl    Bilirubin Urine Negative Negative    Ketones Urine these issues and agrees to the plan. The patient is asked to return in March 2019 or sooner if needed.

## 2018-10-31 NOTE — PATIENT INSTRUCTIONS
Continue current medications. Increase hydration. Healthy diet. Take vitamin D prescription once a week for 12 weeks. Take vitamin D3 1000 units once a day on other days of the week until next visit. Continue taking vitamin B12 1000 mcg daily.   Sched

## 2018-11-09 DIAGNOSIS — G30.1 LATE ONSET ALZHEIMER'S DISEASE WITHOUT BEHAVIORAL DISTURBANCE (HCC): ICD-10-CM

## 2018-11-09 DIAGNOSIS — F02.80 LATE ONSET ALZHEIMER'S DISEASE WITHOUT BEHAVIORAL DISTURBANCE (HCC): ICD-10-CM

## 2018-11-16 RX ORDER — DONEPEZIL HYDROCHLORIDE 23 MG/1
TABLET, FILM COATED ORAL
Qty: 90 TABLET | Refills: 1 | Status: SHIPPED | OUTPATIENT
Start: 2018-11-16 | End: 2019-05-19

## 2018-12-20 RX ORDER — TOLTERODINE 4 MG/1
CAPSULE, EXTENDED RELEASE ORAL
Qty: 90 CAPSULE | Refills: 0 | Status: SHIPPED | OUTPATIENT
Start: 2018-12-20 | End: 2019-12-30

## 2018-12-20 NOTE — TELEPHONE ENCOUNTER
Stephanie Khan TOLTERODINE ER 4MG CAP    Non protocol medication. Please see pended medications. Please sign if appropriate.       Thank you

## 2018-12-27 RX ORDER — TOLTERODINE 4 MG/1
CAPSULE, EXTENDED RELEASE ORAL
Qty: 90 CAPSULE | Refills: 0 | Status: SHIPPED | OUTPATIENT
Start: 2018-12-27 | End: 2019-03-20

## 2018-12-27 RX ORDER — MONTELUKAST SODIUM 10 MG/1
TABLET ORAL
Qty: 90 TABLET | Refills: 0 | Status: SHIPPED | OUTPATIENT
Start: 2018-12-27 | End: 2019-07-08

## 2018-12-27 NOTE — TELEPHONE ENCOUNTER
MONTELUKAST 10MG TAB  Patient failed protocol due to a AAP and a ACT   Is needed. However the pt has only come in in the past for sickness or asthma. Please see pended medications. Please sign if appropriate.       Thank you

## 2019-01-02 RX ORDER — SIMVASTATIN 20 MG
TABLET ORAL
Qty: 90 TABLET | Refills: 0 | Status: SHIPPED | OUTPATIENT
Start: 2019-01-02 | End: 2019-04-02

## 2019-01-17 DIAGNOSIS — R82.90 ABNORMAL URINE ODOR: Primary | ICD-10-CM

## 2019-01-17 DIAGNOSIS — R53.83 OTHER FATIGUE: ICD-10-CM

## 2019-01-21 ENCOUNTER — APPOINTMENT (OUTPATIENT)
Dept: LAB | Age: 84
End: 2019-01-21
Attending: FAMILY MEDICINE
Payer: MEDICARE

## 2019-01-21 ENCOUNTER — TELEPHONE (OUTPATIENT)
Dept: FAMILY MEDICINE CLINIC | Facility: CLINIC | Age: 84
End: 2019-01-21

## 2019-01-21 DIAGNOSIS — R82.90 ABNORMAL URINE ODOR: ICD-10-CM

## 2019-01-21 DIAGNOSIS — E55.9 VITAMIN D DEFICIENCY: ICD-10-CM

## 2019-01-21 DIAGNOSIS — R53.83 OTHER FATIGUE: ICD-10-CM

## 2019-01-21 LAB
BILIRUB UR QL STRIP.AUTO: NEGATIVE
COLOR UR AUTO: YELLOW
GLUCOSE UR STRIP.AUTO-MCNC: NEGATIVE MG/DL
KETONES UR STRIP.AUTO-MCNC: NEGATIVE MG/DL
LEUKOCYTE ESTERASE UR QL STRIP.AUTO: NEGATIVE
NITRITE UR QL STRIP.AUTO: NEGATIVE
PH UR STRIP.AUTO: 8 [PH] (ref 4.5–8)
PROT UR STRIP.AUTO-MCNC: NEGATIVE MG/DL
RBC UR QL AUTO: NEGATIVE
SP GR UR STRIP.AUTO: 1.01 (ref 1–1.03)
UROBILINOGEN UR STRIP.AUTO-MCNC: <2 MG/DL

## 2019-01-21 PROCEDURE — 87077 CULTURE AEROBIC IDENTIFY: CPT

## 2019-01-21 PROCEDURE — 87086 URINE CULTURE/COLONY COUNT: CPT

## 2019-01-21 PROCEDURE — 81003 URINALYSIS AUTO W/O SCOPE: CPT

## 2019-01-21 RX ORDER — ERGOCALCIFEROL 1.25 MG/1
CAPSULE ORAL
Qty: 12 CAPSULE | Refills: 0 | OUTPATIENT
Start: 2019-01-21

## 2019-01-25 RX ORDER — AMPICILLIN 500 MG/1
500 CAPSULE ORAL 4 TIMES DAILY
Qty: 28 CAPSULE | Refills: 0 | Status: SHIPPED | OUTPATIENT
Start: 2019-01-25 | End: 2019-02-01

## 2019-02-11 RX ORDER — MEMANTINE HYDROCHLORIDE 10 MG/1
TABLET ORAL
Qty: 180 TABLET | Refills: 1 | Status: SHIPPED | OUTPATIENT
Start: 2019-02-11 | End: 2019-09-13

## 2019-02-11 NOTE — TELEPHONE ENCOUNTER
MEMANTINE  10MG      TAB    Non protocol medication. Please see pended medications. Please sign if appropriate. Thank you    Her last OV was on 10/31/2018. Her last refill was on 04/23/2018 for 180/1 refills.

## 2019-03-06 ENCOUNTER — TELEPHONE (OUTPATIENT)
Dept: FAMILY MEDICINE CLINIC | Facility: CLINIC | Age: 84
End: 2019-03-06

## 2019-03-06 NOTE — TELEPHONE ENCOUNTER
Please call patient to schedule their Medicare Annual Well visit after 3/22/19. Please let Staci Oliver know once scheduled.

## 2019-03-08 ENCOUNTER — TELEPHONE (OUTPATIENT)
Dept: FAMILY MEDICINE CLINIC | Facility: CLINIC | Age: 84
End: 2019-03-08

## 2019-03-08 ENCOUNTER — APPOINTMENT (OUTPATIENT)
Dept: GENERAL RADIOLOGY | Facility: HOSPITAL | Age: 84
End: 2019-03-08
Attending: EMERGENCY MEDICINE
Payer: MEDICARE

## 2019-03-08 ENCOUNTER — HOSPITAL ENCOUNTER (OUTPATIENT)
Facility: HOSPITAL | Age: 84
Setting detail: OBSERVATION
Discharge: HOME HEALTH | End: 2019-03-10
Attending: EMERGENCY MEDICINE | Admitting: HOSPITALIST
Payer: MEDICARE

## 2019-03-08 DIAGNOSIS — R53.1 WEAKNESS GENERALIZED: Primary | ICD-10-CM

## 2019-03-08 DIAGNOSIS — J11.1 INFLUENZA: ICD-10-CM

## 2019-03-08 LAB
ADENOVIRUS PCR:: NEGATIVE
ALBUMIN SERPL-MCNC: 3.4 G/DL (ref 3.4–5)
ALBUMIN/GLOB SERPL: 0.9 {RATIO} (ref 1–2)
ALP LIVER SERPL-CCNC: 59 U/L (ref 55–142)
ALT SERPL-CCNC: 23 U/L (ref 13–56)
ANION GAP SERPL CALC-SCNC: 7 MMOL/L (ref 0–18)
AST SERPL-CCNC: 25 U/L (ref 15–37)
ATRIAL RATE: 99 BPM
B PERT DNA SPEC QL NAA+PROBE: NEGATIVE
BASOPHILS # BLD AUTO: 0.03 X10(3) UL (ref 0–0.2)
BASOPHILS NFR BLD AUTO: 0.7 %
BILIRUB SERPL-MCNC: 0.4 MG/DL (ref 0.1–2)
BILIRUB UR QL STRIP.AUTO: NEGATIVE
BUN BLD-MCNC: 17 MG/DL (ref 7–18)
BUN/CREAT SERPL: 15.6 (ref 10–20)
C PNEUM DNA SPEC QL NAA+PROBE: NEGATIVE
CALCIUM BLD-MCNC: 8.8 MG/DL (ref 8.5–10.1)
CHLORIDE SERPL-SCNC: 106 MMOL/L (ref 98–107)
CLARITY UR REFRACT.AUTO: CLEAR
CO2 SERPL-SCNC: 24 MMOL/L (ref 21–32)
COLOR UR AUTO: YELLOW
CORONAVIRUS 229E PCR:: NEGATIVE
CORONAVIRUS HKU1 PCR:: NEGATIVE
CORONAVIRUS NL63 PCR:: NEGATIVE
CORONAVIRUS OC43 PCR:: NEGATIVE
CREAT BLD-MCNC: 1.09 MG/DL (ref 0.55–1.02)
DEPRECATED RDW RBC AUTO: 47.8 FL (ref 35.1–46.3)
EOSINOPHIL # BLD AUTO: 0.14 X10(3) UL (ref 0–0.7)
EOSINOPHIL NFR BLD AUTO: 3.2 %
ERYTHROCYTE [DISTWIDTH] IN BLOOD BY AUTOMATED COUNT: 14.3 % (ref 11–15)
FLUAV H1 2009 PAND RNA SPEC QL NAA+PROBE: POSITIVE
FLUBV RNA SPEC QL NAA+PROBE: NEGATIVE
GLOBULIN PLAS-MCNC: 4 G/DL (ref 2.8–4.4)
GLUCOSE BLD-MCNC: 93 MG/DL (ref 70–99)
GLUCOSE UR STRIP.AUTO-MCNC: NEGATIVE MG/DL
HCT VFR BLD AUTO: 40.7 % (ref 35–48)
HGB BLD-MCNC: 13.2 G/DL (ref 12–16)
IMM GRANULOCYTES # BLD AUTO: 0.01 X10(3) UL (ref 0–1)
IMM GRANULOCYTES NFR BLD: 0.2 %
KETONES UR STRIP.AUTO-MCNC: NEGATIVE MG/DL
LEUKOCYTE ESTERASE UR QL STRIP.AUTO: NEGATIVE
LYMPHOCYTES # BLD AUTO: 1.23 X10(3) UL (ref 1–4)
LYMPHOCYTES NFR BLD AUTO: 28.2 %
M PROTEIN MFR SERPL ELPH: 7.4 G/DL (ref 6.4–8.2)
MCH RBC QN AUTO: 29.6 PG (ref 26–34)
MCHC RBC AUTO-ENTMCNC: 32.4 G/DL (ref 31–37)
MCV RBC AUTO: 91.3 FL (ref 80–100)
METAPNEUMOVIRUS PCR:: NEGATIVE
MONOCYTES # BLD AUTO: 0.8 X10(3) UL (ref 0.1–1)
MONOCYTES NFR BLD AUTO: 18.3 %
MYCOPLASMA PNEUMONIA PCR:: NEGATIVE
NEUTROPHILS # BLD AUTO: 2.15 X10 (3) UL (ref 1.5–7.7)
NEUTROPHILS # BLD AUTO: 2.15 X10(3) UL (ref 1.5–7.7)
NEUTROPHILS NFR BLD AUTO: 49.4 %
NITRITE UR QL STRIP.AUTO: NEGATIVE
OSMOLALITY SERPL CALC.SUM OF ELEC: 285 MOSM/KG (ref 275–295)
P AXIS: 45 DEGREES
P-R INTERVAL: 150 MS
PARAINFLUENZA 1 PCR:: NEGATIVE
PARAINFLUENZA 2 PCR:: NEGATIVE
PARAINFLUENZA 3 PCR:: NEGATIVE
PARAINFLUENZA 4 PCR:: NEGATIVE
PH UR STRIP.AUTO: 7 [PH] (ref 4.5–8)
PLATELET # BLD AUTO: 152 10(3)UL (ref 150–450)
POTASSIUM SERPL-SCNC: 4.5 MMOL/L (ref 3.5–5.1)
PROCALCITONIN SERPL-MCNC: 0.05 NG/ML
PROT UR STRIP.AUTO-MCNC: NEGATIVE MG/DL
Q-T INTERVAL: 380 MS
QRS DURATION: 80 MS
QTC CALCULATION (BEZET): 487 MS
R AXIS: -33 DEGREES
RBC # BLD AUTO: 4.46 X10(6)UL (ref 3.8–5.3)
RHINOVIRUS/ENTERO PCR:: NEGATIVE
RSV RNA SPEC QL NAA+PROBE: NEGATIVE
SODIUM SERPL-SCNC: 137 MMOL/L (ref 136–145)
SP GR UR STRIP.AUTO: 1.01 (ref 1–1.03)
T AXIS: 14 DEGREES
UROBILINOGEN UR STRIP.AUTO-MCNC: <2 MG/DL
VENTRICULAR RATE: 99 BPM
WBC # BLD AUTO: 4.4 X10(3) UL (ref 4–11)

## 2019-03-08 PROCEDURE — 71045 X-RAY EXAM CHEST 1 VIEW: CPT | Performed by: EMERGENCY MEDICINE

## 2019-03-08 PROCEDURE — 99220 INITIAL OBSERVATION CARE,LEVL III: CPT | Performed by: HOSPITALIST

## 2019-03-08 RX ORDER — ENOXAPARIN SODIUM 100 MG/ML
40 INJECTION SUBCUTANEOUS NIGHTLY
Status: DISCONTINUED | OUTPATIENT
Start: 2019-03-08 | End: 2019-03-10

## 2019-03-08 RX ORDER — MONTELUKAST SODIUM 10 MG/1
10 TABLET ORAL NIGHTLY
Status: DISCONTINUED | OUTPATIENT
Start: 2019-03-08 | End: 2019-03-10

## 2019-03-08 RX ORDER — OSELTAMIVIR PHOSPHATE 30 MG/1
30 CAPSULE ORAL 2 TIMES DAILY
Status: DISCONTINUED | OUTPATIENT
Start: 2019-03-09 | End: 2019-03-10

## 2019-03-08 RX ORDER — ALBUTEROL SULFATE 2.5 MG/3ML
2.5 SOLUTION RESPIRATORY (INHALATION) EVERY 4 HOURS PRN
Status: DISCONTINUED | OUTPATIENT
Start: 2019-03-08 | End: 2019-03-10

## 2019-03-08 RX ORDER — DONEPEZIL HYDROCHLORIDE 10 MG/1
20 TABLET, FILM COATED ORAL DAILY
Status: DISCONTINUED | OUTPATIENT
Start: 2019-03-09 | End: 2019-03-10

## 2019-03-08 RX ORDER — METOCLOPRAMIDE HYDROCHLORIDE 5 MG/ML
5 INJECTION INTRAMUSCULAR; INTRAVENOUS EVERY 8 HOURS PRN
Status: DISCONTINUED | OUTPATIENT
Start: 2019-03-08 | End: 2019-03-10

## 2019-03-08 RX ORDER — SODIUM CHLORIDE 9 MG/ML
INJECTION, SOLUTION INTRAVENOUS CONTINUOUS
Status: DISCONTINUED | OUTPATIENT
Start: 2019-03-08 | End: 2019-03-10

## 2019-03-08 RX ORDER — OSELTAMIVIR PHOSPHATE 75 MG/1
75 CAPSULE ORAL ONCE
Status: COMPLETED | OUTPATIENT
Start: 2019-03-08 | End: 2019-03-08

## 2019-03-08 RX ORDER — OXYBUTYNIN CHLORIDE 10 MG/1
10 TABLET, EXTENDED RELEASE ORAL DAILY
Status: DISCONTINUED | OUTPATIENT
Start: 2019-03-09 | End: 2019-03-10

## 2019-03-08 RX ORDER — IPRATROPIUM BROMIDE 21 UG/1
2 SPRAY, METERED NASAL EVERY 12 HOURS
Status: DISCONTINUED | OUTPATIENT
Start: 2019-03-08 | End: 2019-03-10

## 2019-03-08 RX ORDER — ASPIRIN 81 MG/1
81 TABLET, CHEWABLE ORAL DAILY
Status: DISCONTINUED | OUTPATIENT
Start: 2019-03-09 | End: 2019-03-10

## 2019-03-08 RX ORDER — ONDANSETRON 2 MG/ML
4 INJECTION INTRAMUSCULAR; INTRAVENOUS EVERY 6 HOURS PRN
Status: DISCONTINUED | OUTPATIENT
Start: 2019-03-08 | End: 2019-03-10

## 2019-03-08 RX ORDER — MEMANTINE HYDROCHLORIDE 10 MG/1
10 TABLET ORAL 2 TIMES DAILY
Status: DISCONTINUED | OUTPATIENT
Start: 2019-03-08 | End: 2019-03-10

## 2019-03-08 RX ORDER — ACETAMINOPHEN 325 MG/1
650 TABLET ORAL EVERY 6 HOURS PRN
Status: DISCONTINUED | OUTPATIENT
Start: 2019-03-08 | End: 2019-03-10

## 2019-03-08 RX ORDER — OSELTAMIVIR PHOSPHATE 75 MG/1
75 CAPSULE ORAL DAILY
Qty: 10 CAPSULE | Refills: 0 | Status: ON HOLD | OUTPATIENT
Start: 2019-03-08 | End: 2019-03-10

## 2019-03-08 RX ORDER — CETIRIZINE HYDROCHLORIDE 10 MG/1
10 TABLET ORAL DAILY
Status: DISCONTINUED | OUTPATIENT
Start: 2019-03-09 | End: 2019-03-10

## 2019-03-08 RX ORDER — TOBRAMYCIN 3 MG/ML
2 SOLUTION/ DROPS OPHTHALMIC EVERY 4 HOURS
Status: DISCONTINUED | OUTPATIENT
Start: 2019-03-08 | End: 2019-03-08

## 2019-03-08 RX ORDER — ATORVASTATIN CALCIUM 10 MG/1
10 TABLET, FILM COATED ORAL NIGHTLY
Status: DISCONTINUED | OUTPATIENT
Start: 2019-03-08 | End: 2019-03-10

## 2019-03-08 RX ORDER — ONDANSETRON 2 MG/ML
4 INJECTION INTRAMUSCULAR; INTRAVENOUS EVERY 4 HOURS PRN
Status: DISCONTINUED | OUTPATIENT
Start: 2019-03-08 | End: 2019-03-08

## 2019-03-08 NOTE — TELEPHONE ENCOUNTER
Family member confirms they were tested, but unsure of type. He said they were seen at [facility] and they were given treatment for flu. And yes it is the respiratory influenza.

## 2019-03-08 NOTE — TELEPHONE ENCOUNTER
Pt's  calling 4 of their family members including him have the flu all but Everett Kitchen, he is asking of we can prescribe her Tamaflu just in case.  Please advise [phone] [address] [facility], [address] [place] [address]  [phone] [facility] [phone]

## 2019-03-08 NOTE — TELEPHONE ENCOUNTER
Please ask if patient's family was tested for influenza? Was it influenza A? Do they  have stomach symptoms or respiratory symptoms ?

## 2019-03-08 NOTE — ED INITIAL ASSESSMENT (HPI)
Patient here by ems from home with daughter at bedside. Daughter reports decreased appetite today and generalized weakness. 1 episode of diarrhea per daughter, denies vomiting. Patient poor historian with hx of dementia. Patient denies cough/congestion.  Claudine Julio

## 2019-03-09 LAB
ANION GAP SERPL CALC-SCNC: 5 MMOL/L (ref 0–18)
BASOPHILS # BLD AUTO: 0.02 X10(3) UL (ref 0–0.2)
BASOPHILS NFR BLD AUTO: 0.5 %
BUN BLD-MCNC: 11 MG/DL (ref 7–18)
BUN/CREAT SERPL: 13.3 (ref 10–20)
CALCIUM BLD-MCNC: 8.1 MG/DL (ref 8.5–10.1)
CHLORIDE SERPL-SCNC: 108 MMOL/L (ref 98–107)
CO2 SERPL-SCNC: 26 MMOL/L (ref 21–32)
CREAT BLD-MCNC: 0.83 MG/DL (ref 0.55–1.02)
DEPRECATED RDW RBC AUTO: 47.3 FL (ref 35.1–46.3)
EOSINOPHIL # BLD AUTO: 0.05 X10(3) UL (ref 0–0.7)
EOSINOPHIL NFR BLD AUTO: 1.3 %
ERYTHROCYTE [DISTWIDTH] IN BLOOD BY AUTOMATED COUNT: 14.4 % (ref 11–15)
GLUCOSE BLD-MCNC: 82 MG/DL (ref 70–99)
HCT VFR BLD AUTO: 36.7 % (ref 35–48)
HGB BLD-MCNC: 12.2 G/DL (ref 12–16)
IMM GRANULOCYTES # BLD AUTO: 0.01 X10(3) UL (ref 0–1)
IMM GRANULOCYTES NFR BLD: 0.3 %
LYMPHOCYTES # BLD AUTO: 1.81 X10(3) UL (ref 1–4)
LYMPHOCYTES NFR BLD AUTO: 46.1 %
MCH RBC QN AUTO: 30 PG (ref 26–34)
MCHC RBC AUTO-ENTMCNC: 33.2 G/DL (ref 31–37)
MCV RBC AUTO: 90.2 FL (ref 80–100)
MONOCYTES # BLD AUTO: 0.79 X10(3) UL (ref 0.1–1)
MONOCYTES NFR BLD AUTO: 20.1 %
NEUTROPHILS # BLD AUTO: 1.25 X10 (3) UL (ref 1.5–7.7)
NEUTROPHILS # BLD AUTO: 1.25 X10(3) UL (ref 1.5–7.7)
NEUTROPHILS NFR BLD AUTO: 31.7 %
OSMOLALITY SERPL CALC.SUM OF ELEC: 286 MOSM/KG (ref 275–295)
PLATELET # BLD AUTO: 122 10(3)UL (ref 150–450)
POTASSIUM SERPL-SCNC: 3.9 MMOL/L (ref 3.5–5.1)
RBC # BLD AUTO: 4.07 X10(6)UL (ref 3.8–5.3)
SODIUM SERPL-SCNC: 139 MMOL/L (ref 136–145)
WBC # BLD AUTO: 3.9 X10(3) UL (ref 4–11)

## 2019-03-09 PROCEDURE — 99226 SUBSEQUENT OBSERVATION CARE: CPT | Performed by: HOSPITALIST

## 2019-03-09 RX ORDER — ASPIRIN 81 MG/1
TABLET, CHEWABLE ORAL
Status: COMPLETED
Start: 2019-03-09 | End: 2019-03-09

## 2019-03-09 RX ORDER — IPRATROPIUM BROMIDE AND ALBUTEROL SULFATE 2.5; .5 MG/3ML; MG/3ML
3 SOLUTION RESPIRATORY (INHALATION)
Status: DISCONTINUED | OUTPATIENT
Start: 2019-03-09 | End: 2019-03-10

## 2019-03-09 NOTE — ED NOTES
hospitalist at bedside at this time to assess patient. Daughter remains at bedside. Latisha Barone  has been at bedside as well.

## 2019-03-09 NOTE — PLAN OF CARE
Pt A/Ox1, confused and [place], on RA, no tele  Pt resting comfortably, denies any pain  0.9 infusing at 100 ml/hr  PLAN: PT/OT eval  Will cont to monitor

## 2019-03-09 NOTE — PROGRESS NOTES
[facility] Pharmacy Note:  Renal Adjustment for oseltamivir (TAMIFLU)    Indiana Garcia is a 80year old female who has been prescribed oseltamivir (TAMIFLU) 75 mg every 24 hrs.   CrCl is estimated creatinine clearance is 36 mL/min (A) (based on SCr of 1.09 mg/dL

## 2019-03-09 NOTE — ED PROVIDER NOTES
Patient Seen in: [facility] Emergency Department    History   Patient presents with:  Fatigue (constitutional, neurologic)    Stated Complaint: unable to stand, weakness    HPI    Patient is a pleasant 49-year-old female, presenting for evaluation o reviewed and negative except as noted above.     Physical Exam     ED Triage Vitals [03/08/19 1530]   /64   Pulse 100   Resp 19   Temp 99.2 °F (37.3 °C)   Temp src Temporal   SpO2 95 %   O2 Device None (Room air)       Current:/80   Pulse 92   T RDW-SD 47.8 (*)     All other components within normal limits   CBC WITH DIFFERENTIAL WITH PLATELET    Narrative: The following orders were created for panel order CBC WITH DIFFERENTIAL WITH PLATELET.   Procedure                               Abnormalit laboratory and radiology studies were reviewed and discussed with the patient/family. Influenza positive. Tamiflu ordered. This likely explains the patient's acute weakness and inability to stand/ambulate. Admission for supportive care was recommended.

## 2019-03-09 NOTE — ED NOTES
Patient and daughter remain updated with plan of care. Waiting for transport upstairs. Belongings placed in bag. Patient sleeping intermittently, responds to name and light touch. Patient wearing depends placed by staff, incontinent of urine. VSS.

## 2019-03-09 NOTE — H&P
[facility]PITAL[facility]  History and Physical     Mikki Jacob Patient Status:  Observation    1932 MRN [id number]   [facility] 3NE-A Attending Guille Ventura MD   Hosp Day # 0 PCP Kaz Shaw MD     Chief Complaint: weakness RASH  Seasonal                  Sulfa Antibiotics       UNKNOWN    Comment:Pt unsure    Medications:    No current facility-administered medications on file prior to encounter.    Current Outpatient Medications on File Prior to Encounter:  Jhon Steele total) by nebulization every 4 (four) hours as needed for Wheezing. Disp: 75 ampule Rfl: 1   Albuterol Sulfate HFA (VENTOLIN) 108 (90 BASE) MCG/ACT Inhalation Aero Soln Inhale 2 puffs into the lungs every 4 (four) hours as needed for Wheezing.  Disp:  Rfl: Creatinine Clearance: 36 mL/min (A) (based on SCr of 1.09 mg/dL (H)). No results for input(s): PTP, INR in the last 168 hours. No results for input(s): TROP, CK in the last 168 hours. Imaging: Imaging data reviewed in Epic.       ASSESSMENT / PLAN:

## 2019-03-09 NOTE — PROGRESS NOTES
[facility] Pharmacy Note:  Renal Dose Adjustment for Metoclopramide (REGLAN)    Indiana Garcia has been prescribed Metoclopramide (REGLAN) 10 mg every 8 hours as needed for nausea/vomiting.     Estimated Creatinine Clearance: 36 mL/min (A) (based on SCr of 1.09 m

## 2019-03-10 VITALS
SYSTOLIC BLOOD PRESSURE: 113 MMHG | RESPIRATION RATE: 20 BRPM | OXYGEN SATURATION: 92 % | HEART RATE: 78 BPM | BODY MASS INDEX: 28.96 KG/M2 | TEMPERATURE: 98 F | DIASTOLIC BLOOD PRESSURE: 62 MMHG | HEIGHT: 67 IN | WEIGHT: 184.5 LBS

## 2019-03-10 LAB — PLATELET # BLD AUTO: 116 10(3)UL (ref 150–450)

## 2019-03-10 PROCEDURE — 99217 OBSERVATION CARE DISCHARGE: CPT | Performed by: HOSPITALIST

## 2019-03-10 RX ORDER — IPRATROPIUM BROMIDE AND ALBUTEROL SULFATE 2.5; .5 MG/3ML; MG/3ML
3 SOLUTION RESPIRATORY (INHALATION)
Status: DISCONTINUED | OUTPATIENT
Start: 2019-03-10 | End: 2019-03-10

## 2019-03-10 RX ORDER — OSELTAMIVIR PHOSPHATE 30 MG/1
30 CAPSULE ORAL 2 TIMES DAILY
Qty: 7 CAPSULE | Refills: 0 | Status: SHIPPED | OUTPATIENT
Start: 2019-03-10 | End: 2019-03-14

## 2019-03-10 NOTE — PHYSICAL THERAPY NOTE
PHYSICAL THERAPY EVALUATION - INPATIENT     Room Number: 4962/6106-V  Evaluation Date: 3/10/2019  Type of Evaluation: Initial  Physician Order: PT Eval and Treat    Presenting Problem: Influenza  Reason for Therapy: Mobility Dysfunction and Discharge checks in frequently. Family reports no history of falls. PLOF obtained from dtrs as pt is poor historian. SUBJECTIVE  \"I can usually do this\" referring to getting out of bed. Patient self-stated goal is to walk to bathroom.     OBJECTIVE  Precauti from a bed to a chair (including a wheelchair)?: A Little   -   Need to walk in hospital room?: A Little   -   Climbing 3-5 steps with a railing?: A Lot       AM-PAC Score:  Raw Score: 19   Approx Degree of Impairment: 41.77%   Standardized Score (AM-PAC S is borderline in terms of safe mobility and requires some assist in home. Dtrs report spouse and family have been providing this. Pt is not left alone in home.  The AM-PAC '6-Clicks' Inpatient Basic Mobility Short Form was completed and this patient is demo

## 2019-03-10 NOTE — HOME CARE LIAISON
Referral received from [facility]. Met with patient and daughters at the bedside. They are agreeable to [facility] services, RN/PT/OT/Aide. All questions address and answered. Brochure provided.

## 2019-03-10 NOTE — PLAN OF CARE
Impaired Functional Mobility    • Achieve highest/safest level of mobility/gait Adequate for Discharge        Impaired Functional Mobility    • Achieve highest/safest level of mobility/gait Adequate for Discharge        Patient/Family Goals    • Patient/Fa

## 2019-03-10 NOTE — CM/SW NOTE
Met w/pt and pt's daughter regarding dc plan. Pt lives at home w/ and uses a walker. Pt and daughters agreeable to [facility] at discharge and did not have an agency preference. Pt agreeable to a referral to [facility] care.  CARLEY humphreysd [facility] w/referral. CARLEY

## 2019-03-11 ENCOUNTER — PATIENT OUTREACH (OUTPATIENT)
Dept: CASE MANAGEMENT | Age: 84
End: 2019-03-11

## 2019-03-11 ENCOUNTER — TELEPHONE (OUTPATIENT)
Dept: FAMILY MEDICINE CLINIC | Facility: CLINIC | Age: 84
End: 2019-03-11

## 2019-03-11 DIAGNOSIS — Z02.9 ENCOUNTERS FOR UNSPECIFIED ADMINISTRATIVE PURPOSE: ICD-10-CM

## 2019-03-11 DIAGNOSIS — J11.1 INFLUENZA: ICD-10-CM

## 2019-03-11 DIAGNOSIS — R53.1 WEAKNESS GENERALIZED: ICD-10-CM

## 2019-03-11 PROCEDURE — 1111F DSCHRG MED/CURRENT MED MERGE: CPT

## 2019-03-11 NOTE — TELEPHONE ENCOUNTER
Incoming call from [facility]. Requests orders for Nursing, physical therapy, occupational therapy, and a bath aid. Patient hospitalized 3/8-3/10 diagnoses influenza A.   Reports vitals are good, oxygen decreases while sleeping, but

## 2019-03-11 NOTE — PROGRESS NOTES
Initial Post Discharge Follow Up   Discharge Date: 3/10/19  Contact Date: 3/11/2019    Consent Verification:  Assessment Completed With: Patient  HIPAA Verified?   Yes    Discharge Dx:    Generalized weakness, Influenza A    General:   • How have you bee TABLET BY MOUTH ONCE DAILY Disp: 90 tablet Rfl: 1   Ipratropium Bromide 0.03 % Nasal Solution 2 sprays by Nasal route every 12 (twelve) hours. Disp: 30 mL Rfl: 2   Triamcinolone Acetonide 55 MCG/ACT Nasal Aerosol 1 spray by Nasal route daily.  Disp:  Rfl: up?  Yes   If Yes: With Whom: [facility]   When: 3/11/19    DME ordered at D/C? No, patient has a walker     Services ordered at D/C?   No     DX: specifics:  Reviewed Flu discharge instructions with patient's  Apolinar Markell, he verbalized understanding meter/supplies if applicable

## 2019-03-12 NOTE — DISCHARGE SUMMARY
[facility]  DISCHARGE SUMMARY     María Watters Patient Status:  Observation    1932 MRN [id number]   [facility] 3NE-A Attending No att. providers found   [facility] Day # 0 PCP Smitha Lopez MD     Date of Admission: 3/8/2019 0        CONTINUE taking these medications      Instructions Prescription details   albuterol sulfate (2.5 MG/3ML) 0.083% Nebu  Commonly known as:  VENTOLIN      Take 3 mL (2.5 mg total) by nebulization every 4 (four) hours as needed for Wheezing.    Anton Singletary DAILY   Quantity:  90 capsule  Refills:  0     Triamcinolone Acetonide 55 MCG/ACT Aero  Commonly known as:  NASACORT      1 spray by Nasal route daily.    Refills:  0        STOP taking these medications    predniSONE 10 MG Tabs  Commonly known as:  DELTASO

## 2019-03-20 ENCOUNTER — OFFICE VISIT (OUTPATIENT)
Dept: FAMILY MEDICINE CLINIC | Facility: CLINIC | Age: 84
End: 2019-03-20
Payer: MEDICARE

## 2019-03-20 ENCOUNTER — TELEPHONE (OUTPATIENT)
Dept: FAMILY MEDICINE CLINIC | Facility: CLINIC | Age: 84
End: 2019-03-20

## 2019-03-20 VITALS
OXYGEN SATURATION: 93 % | TEMPERATURE: 95 F | HEIGHT: 63 IN | BODY MASS INDEX: 32.96 KG/M2 | SYSTOLIC BLOOD PRESSURE: 96 MMHG | HEART RATE: 72 BPM | DIASTOLIC BLOOD PRESSURE: 50 MMHG | WEIGHT: 186 LBS | RESPIRATION RATE: 16 BRPM

## 2019-03-20 DIAGNOSIS — G30.1 LATE ONSET ALZHEIMER'S DISEASE WITHOUT BEHAVIORAL DISTURBANCE (HCC): ICD-10-CM

## 2019-03-20 DIAGNOSIS — F02.80 LATE ONSET ALZHEIMER'S DISEASE WITHOUT BEHAVIORAL DISTURBANCE (HCC): ICD-10-CM

## 2019-03-20 DIAGNOSIS — J45.30 MILD PERSISTENT ASTHMA WITHOUT COMPLICATION: ICD-10-CM

## 2019-03-20 DIAGNOSIS — R05.9 COUGH: ICD-10-CM

## 2019-03-20 DIAGNOSIS — J10.1 INFLUENZA A: Primary | ICD-10-CM

## 2019-03-20 DIAGNOSIS — R53.1 WEAKNESS GENERALIZED: ICD-10-CM

## 2019-03-20 PROCEDURE — 1111F DSCHRG MED/CURRENT MED MERGE: CPT | Performed by: FAMILY MEDICINE

## 2019-03-20 PROCEDURE — 99495 TRANSJ CARE MGMT MOD F2F 14D: CPT | Performed by: FAMILY MEDICINE

## 2019-03-20 NOTE — TELEPHONE ENCOUNTER
FYI  Incoming call from [facility], [facility]. Patient appointment for RN visit 3/21/2019 is rescheduled until 3/26/2019. Patient (caregiver is not feeling well).

## 2019-03-20 NOTE — PATIENT INSTRUCTIONS
Increase Flovent inhaler to 2 puffs twice a day for 1 week then continue after this 1 puff twice a day which is your usual dosing. Continue other medications at current doses. Keep good hydration.   Continue working with physical therapy occupational ther

## 2019-03-20 NOTE — PROGRESS NOTES
HPI:    Alan Sorensen is a 80year old female here today for hospital follow up.    She was discharged from Inpatient hospital, [facility] to Home   Admission Date: 3/8/19   Discharge Date: 3/10/19  Hospital Discharge Diagnoses (since 2/18/2019)  I here for follow-up. She is doing much better. There is no fever no chills. No muscle aches. Daughter says that since discharge from the hospital she has some dry cough. She was giving some nebulizers at the hospital.  Patient has history of asthma.   Jovon Marrow ASPIRIN Take 81 mg by mouth daily. FISH OIL 1 TABLET DAILY    EYE-TRINITY PLUS LUTEIN OR 1 TABLET DAILY    CITRACAL + D OR BID     No current facility-administered medications on file prior to visit.        HISTORY: reconciled and reviewed with patient index is 32.95 kg/m² as calculated from the following:    Height as of this encounter: 63\". Weight as of this encounter: 186 lb.    BP 96/50 (BP Location: Left arm, Patient Position: Sitting, Cuff Size: adult)   Pulse 72   Temp (!) 94.6 °F (34.8 °C) (Or Range    Urine Color Yellow Yellow    Clarity Urine Clear Clear    Spec Gravity 1.011 1.001 - 1.030    Glucose Urine Negative Negative mg/dl    Bilirubin Urine Negative Negative    Ketones Urine Negative Negative mg/dL    Blood Urine Small (A) Negative Resulted    RESPIRATORY PANEL FLU EXPANDED   Result Value Ref Range    Adenovirus PCR: Negative Negative    Coronavirus 229E PCR: Negative Negative    Coronavirus Hku1 PCR: Negative Negative    Coronavirus Nl63 PCR: Negative Negative    Coronavirus Oc43 PC 12.0 - 16.0 g/dL    HCT 36.7 35.0 - 48.0 %    .0 (L) 150.0 - 450.0 10(3)uL    MCV 90.2 80.0 - 100.0 fL    MCH 30.0 26.0 - 34.0 pg    MCHC 33.2 31.0 - 37.0 g/dL    RDW 14.4 11.0 - 15.0 %    RDW-SD 47.3 (H) 35.1 - 46.3 fL    Neutrophil Absolute Prelim homebound. Asthma patient is using her inhalers regularly. Continue present management    Cough it is dry cough could be related to patient's asthma we will start increasing Flovent inhaler for 1 week and then go back to regular dose.   If there is wors

## 2019-04-02 RX ORDER — SIMVASTATIN 20 MG
TABLET ORAL
Qty: 90 TABLET | Refills: 0 | Status: SHIPPED | OUTPATIENT
Start: 2019-04-02 | End: 2019-07-15

## 2019-04-04 DIAGNOSIS — J30.89 NON-SEASONAL ALLERGIC RHINITIS DUE TO OTHER ALLERGIC TRIGGER: ICD-10-CM

## 2019-04-04 RX ORDER — FLUTICASONE PROPIONATE 220 UG/1
AEROSOL, METERED RESPIRATORY (INHALATION)
Qty: 2 INHALER | Refills: 5 | Status: SHIPPED | OUTPATIENT
Start: 2019-04-04 | End: 2020-08-27

## 2019-04-04 NOTE — TELEPHONE ENCOUNTER
FLOVENT HFA 220MCG  AER  Asthma & COPD Medication Protocol Failed    Please see pended medications. Please sign if appropriate. Thank you    Last OV was on 03/02/2019.

## 2019-04-29 ENCOUNTER — TELEPHONE (OUTPATIENT)
Dept: FAMILY MEDICINE CLINIC | Facility: CLINIC | Age: 84
End: 2019-04-29

## 2019-05-09 ENCOUNTER — OFFICE VISIT (OUTPATIENT)
Dept: FAMILY MEDICINE CLINIC | Facility: CLINIC | Age: 84
End: 2019-05-09
Payer: MEDICARE

## 2019-05-09 VITALS
SYSTOLIC BLOOD PRESSURE: 92 MMHG | DIASTOLIC BLOOD PRESSURE: 60 MMHG | HEIGHT: 63 IN | BODY MASS INDEX: 32.6 KG/M2 | HEART RATE: 84 BPM | RESPIRATION RATE: 18 BRPM | WEIGHT: 184 LBS | TEMPERATURE: 97 F | OXYGEN SATURATION: 95 %

## 2019-05-09 DIAGNOSIS — G30.1 LATE ONSET ALZHEIMER'S DISEASE WITHOUT BEHAVIORAL DISTURBANCE (HCC): ICD-10-CM

## 2019-05-09 DIAGNOSIS — R53.1 WEAKNESS GENERALIZED: ICD-10-CM

## 2019-05-09 DIAGNOSIS — R26.89 BALANCE PROBLEM: ICD-10-CM

## 2019-05-09 DIAGNOSIS — J45.30 MILD PERSISTENT ASTHMA WITHOUT COMPLICATION: ICD-10-CM

## 2019-05-09 DIAGNOSIS — E78.00 PURE HYPERCHOLESTEROLEMIA: ICD-10-CM

## 2019-05-09 DIAGNOSIS — G47.19 EXCESSIVE DAYTIME SLEEPINESS: ICD-10-CM

## 2019-05-09 DIAGNOSIS — Z00.00 ENCOUNTER FOR ANNUAL HEALTH EXAMINATION: Primary | ICD-10-CM

## 2019-05-09 DIAGNOSIS — F02.80 LATE ONSET ALZHEIMER'S DISEASE WITHOUT BEHAVIORAL DISTURBANCE (HCC): ICD-10-CM

## 2019-05-09 DIAGNOSIS — N39.3 STRESS INCONTINENCE OF URINE: ICD-10-CM

## 2019-05-09 DIAGNOSIS — R35.0 URINARY FREQUENCY: ICD-10-CM

## 2019-05-09 PROCEDURE — 87077 CULTURE AEROBIC IDENTIFY: CPT | Performed by: FAMILY MEDICINE

## 2019-05-09 PROCEDURE — 87086 URINE CULTURE/COLONY COUNT: CPT | Performed by: FAMILY MEDICINE

## 2019-05-09 PROCEDURE — 81003 URINALYSIS AUTO W/O SCOPE: CPT | Performed by: FAMILY MEDICINE

## 2019-05-09 PROCEDURE — G0444 DEPRESSION SCREEN ANNUAL: HCPCS | Performed by: FAMILY MEDICINE

## 2019-05-09 PROCEDURE — 99214 OFFICE O/P EST MOD 30 MIN: CPT | Performed by: FAMILY MEDICINE

## 2019-05-09 PROCEDURE — G0439 PPPS, SUBSEQ VISIT: HCPCS | Performed by: FAMILY MEDICINE

## 2019-05-09 PROCEDURE — 87186 SC STD MICRODIL/AGAR DIL: CPT | Performed by: FAMILY MEDICINE

## 2019-05-09 RX ORDER — CEFUROXIME AXETIL 250 MG/1
250 TABLET ORAL 2 TIMES DAILY
Qty: 14 TABLET | Refills: 0 | Status: SHIPPED | OUTPATIENT
Start: 2019-05-09 | End: 2019-06-14 | Stop reason: ALTCHOICE

## 2019-05-09 NOTE — PATIENT INSTRUCTIONS
Continue current meds. Watch diet for fats and carbs. Stay active. Return for fasting blood work fasting. Start antibiotic today per directions. Take probiotic over-the-counter daily like organic yogurt while taking antibiotic.   Drink plenty of flu Welcome to Medicare, and non-screening if indicated for medical reasons Electrocardiogram date03/08/2019 Routine EKG is not a screening covered service except at the Welcome to Medicare Visit    Abdominal aortic aneurysm screening (once between ages 73-68) Risk   There are no preventive care reminders to display for this patient. Chlamydia  Annually if high risk No results found for: CHLAMYDIA No flowsheet data found.     Screening Mammogram      Mammogram    Recommend Annually to at least age 76, and as Recommended Websites for Advanced Directives    [email or web address] org/publications/[email or web address]/personal_dec. pdf  An information packet, including necessary form from the [employer] website. http://www. [place]state. [place]/public/books/a

## 2019-05-09 NOTE — PROGRESS NOTES
HPI:   Crispin Dickey is a 80year old female who presents for a Medicare Subsequent Annual Wellness visit (Pt already had Initial Annual Wellness) still complaining of having   asthma, dementia, hyperlipidemia, balance problem allergic rhinitis, urinar help    She has Toileting difficulties based on screening of functional status. Toileting: Need some help    She has Driving difficulties based on screening of functional status.    Driving: Cannot do without help   She has Meal Preparation difficulties b had a score of 0 so is at low risk.     Patient Care Team: Patient Care Team:  Ronal Sandoval MD as PCP - General (Family Practice)  Ronal Sandoval MD as PCP - [facility]  Dee Snider MD (NEUROLOGY)    Patient Active Problem List:     Vitamin D deficienc Marked as Taking for the 5/9/19 encounter (Office Visit) with Logan Wiley MD:  Cefuroxime Axetil (CEFTIN) 250 MG Oral Tab Take 1 tablet (250 mg total) by mouth 2 (two) times daily.    FLOVENT  MCG/ACT Inhalation Aerosol INHALE 1 PUFF BY MOUTH (1991); appendectomy; tonsillectomy; mastectomy left (1991); and chemotherapy (1991). Her family history includes Cancer in her father. SOCIAL HISTORY:   She  reports that she has quit smoking. Her smoking use included cigarettes and cigars.  She has a tongue normal; teeth and gums normal   Neck: Supple, symmetrical, trachea midline, no adenopathy;  thyroid: not enlarged, symmetric, no tenderness/mass/nodules;    Back:   Symmetric, no curvature, ROM normal, no CVA tenderness   Lungs:    Clear to ausculta Assessment.      PLAN SUMMARY:   Diagnoses and all orders for this visit:    Encounter for annual health examination    Excessive daytime sleepiness  -     URINE CULTURE, ROUTINE; Future    Urinary frequency  -     URINE CULTURE, ROUTINE; Future  -     URIN like organic yogurt while taking antibiotic. Drink plenty of fluids. The patient indicates understanding of these issues and agrees to the plan. Reinforced healthy diet, lifestyle, and exercise. Prescription medication ordered.   Imaging studies o flowsheet data found. Glaucoma Screening      Ophthalmology Visit Annually: Diabetics, FHx Glaucoma, AA>50, > 65 No flowsheet data found.     Bone Density Screening      Dexascan Every two years Last Dexa Scan:   XR DEXA BONE DENSITOMETRY (CPT=77 Template: DAVI MURPHY MEDICARE ANNUAL ASSESSMENT FEMALE [41719]

## 2019-05-13 DIAGNOSIS — R31.9 URINARY TRACT INFECTION WITH HEMATURIA, SITE UNSPECIFIED: Primary | ICD-10-CM

## 2019-05-13 DIAGNOSIS — N39.0 URINARY TRACT INFECTION WITH HEMATURIA, SITE UNSPECIFIED: Primary | ICD-10-CM

## 2019-05-19 DIAGNOSIS — G30.1 LATE ONSET ALZHEIMER'S DISEASE WITHOUT BEHAVIORAL DISTURBANCE (HCC): ICD-10-CM

## 2019-05-19 DIAGNOSIS — F02.80 LATE ONSET ALZHEIMER'S DISEASE WITHOUT BEHAVIORAL DISTURBANCE (HCC): ICD-10-CM

## 2019-05-20 ENCOUNTER — TELEPHONE (OUTPATIENT)
Dept: FAMILY MEDICINE CLINIC | Facility: CLINIC | Age: 84
End: 2019-05-20

## 2019-05-22 ENCOUNTER — LAB ENCOUNTER (OUTPATIENT)
Dept: LAB | Age: 84
End: 2019-05-22
Attending: FAMILY MEDICINE
Payer: MEDICARE

## 2019-05-22 DIAGNOSIS — E55.9 VITAMIN D DEFICIENCY: ICD-10-CM

## 2019-05-22 DIAGNOSIS — N39.0 URINARY TRACT INFECTION WITH HEMATURIA, SITE UNSPECIFIED: ICD-10-CM

## 2019-05-22 DIAGNOSIS — R31.9 URINARY TRACT INFECTION WITH HEMATURIA, SITE UNSPECIFIED: ICD-10-CM

## 2019-05-22 DIAGNOSIS — R73.9 HYPERGLYCEMIA: ICD-10-CM

## 2019-05-22 DIAGNOSIS — E78.00 HYPERCHOLESTEREMIA: ICD-10-CM

## 2019-05-22 DIAGNOSIS — F02.80 LATE ONSET ALZHEIMER'S DISEASE WITHOUT BEHAVIORAL DISTURBANCE (HCC): ICD-10-CM

## 2019-05-22 DIAGNOSIS — G30.1 LATE ONSET ALZHEIMER'S DISEASE WITHOUT BEHAVIORAL DISTURBANCE (HCC): ICD-10-CM

## 2019-05-22 DIAGNOSIS — N39.41 URGE INCONTINENCE OF URINE: ICD-10-CM

## 2019-05-22 PROCEDURE — 87086 URINE CULTURE/COLONY COUNT: CPT

## 2019-05-22 PROCEDURE — 82607 VITAMIN B-12: CPT

## 2019-05-22 PROCEDURE — 80061 LIPID PANEL: CPT

## 2019-05-22 PROCEDURE — 83036 HEMOGLOBIN GLYCOSYLATED A1C: CPT

## 2019-05-22 PROCEDURE — 36415 COLL VENOUS BLD VENIPUNCTURE: CPT

## 2019-05-22 PROCEDURE — 81003 URINALYSIS AUTO W/O SCOPE: CPT

## 2019-05-22 PROCEDURE — 82306 VITAMIN D 25 HYDROXY: CPT

## 2019-05-22 PROCEDURE — 80053 COMPREHEN METABOLIC PANEL: CPT

## 2019-05-22 PROCEDURE — 85025 COMPLETE CBC W/AUTO DIFF WBC: CPT

## 2019-05-22 RX ORDER — DONEPEZIL HYDROCHLORIDE 23 MG/1
TABLET, FILM COATED ORAL
Qty: 90 TABLET | Refills: 1 | Status: SHIPPED | OUTPATIENT
Start: 2019-05-22 | End: 2019-09-13

## 2019-05-22 NOTE — TELEPHONE ENCOUNTER
Last OV : 5/9/2019 7173 No. Saadia Kumar  Upcoming appt/reason:  No future appointments. DONEPEZIL HCL 23 MG Oral Tab 90 tablet 1 11/16/2018      Last labs: 5/22/19  When pt was asked to return for OV:    PLAN:     Continue current meds. Watch diet for fats and carbs.

## 2019-06-14 ENCOUNTER — LAB ENCOUNTER (OUTPATIENT)
Dept: LAB | Age: 84
End: 2019-06-14
Attending: FAMILY MEDICINE
Payer: MEDICARE

## 2019-06-14 ENCOUNTER — OFFICE VISIT (OUTPATIENT)
Dept: FAMILY MEDICINE CLINIC | Facility: CLINIC | Age: 84
End: 2019-06-14
Payer: MEDICARE

## 2019-06-14 VITALS
RESPIRATION RATE: 18 BRPM | TEMPERATURE: 97 F | HEART RATE: 88 BPM | HEIGHT: 63 IN | BODY MASS INDEX: 32.43 KG/M2 | SYSTOLIC BLOOD PRESSURE: 106 MMHG | DIASTOLIC BLOOD PRESSURE: 64 MMHG | WEIGHT: 183 LBS | OXYGEN SATURATION: 93 %

## 2019-06-14 DIAGNOSIS — E55.9 VITAMIN D DEFICIENCY: ICD-10-CM

## 2019-06-14 DIAGNOSIS — D72.819 LEUKOPENIA, UNSPECIFIED TYPE: Primary | ICD-10-CM

## 2019-06-14 DIAGNOSIS — D72.819 LEUKOPENIA, UNSPECIFIED TYPE: ICD-10-CM

## 2019-06-14 DIAGNOSIS — G30.1 LATE ONSET ALZHEIMER'S DISEASE WITHOUT BEHAVIORAL DISTURBANCE (HCC): ICD-10-CM

## 2019-06-14 DIAGNOSIS — M81.0 AGE-RELATED OSTEOPOROSIS WITHOUT CURRENT PATHOLOGICAL FRACTURE: ICD-10-CM

## 2019-06-14 DIAGNOSIS — F02.80 LATE ONSET ALZHEIMER'S DISEASE WITHOUT BEHAVIORAL DISTURBANCE (HCC): ICD-10-CM

## 2019-06-14 DIAGNOSIS — R53.1 WEAKNESS GENERALIZED: ICD-10-CM

## 2019-06-14 DIAGNOSIS — E78.00 PURE HYPERCHOLESTEROLEMIA: ICD-10-CM

## 2019-06-14 DIAGNOSIS — J45.30 MILD PERSISTENT ASTHMA WITHOUT COMPLICATION: ICD-10-CM

## 2019-06-14 PROCEDURE — 99214 OFFICE O/P EST MOD 30 MIN: CPT | Performed by: FAMILY MEDICINE

## 2019-06-14 PROCEDURE — 85025 COMPLETE CBC W/AUTO DIFF WBC: CPT

## 2019-06-14 PROCEDURE — 36415 COLL VENOUS BLD VENIPUNCTURE: CPT

## 2019-06-14 NOTE — PROGRESS NOTES
Bishop De Jesus is a 80year old female. cc asthma, dementia, hypercholesterolemia urinary incontinence, bowel incontinence   HPI:   Patient is coming for follow-up of asthma she is using her inhalers regularly doing well.   She will call for refill as neede sulfate (2.5 MG/3ML) 0.083% Inhalation Nebu Soln Take 3 mL (2.5 mg total) by nebulization every 4 (four) hours as needed for Wheezing.  Disp: 75 ampule Rfl: 1   Albuterol Sulfate HFA (VENTOLIN) 108 (90 BASE) MCG/ACT Inhalation Aero Soln Inhale 2 puffs into 93%   Breastfeeding? No   BMI 32.42 kg/m²   GENERAL: well developed, well nourished,in no apparent distress, patient is here with her  and her daughter Veena Chavarria.   SKIN: no rashes,no suspicious lesions  HEENT: atraumatic, normocephalic,ears and throat a 1.011 1.001 - 1.030    Glucose Urine Negative Negative mg/dl    Bilirubin Urine Negative Negative    Ketones Urine Negative Negative mg/dL    Blood Urine Negative Negative    pH Urine 8.0 4.5 - 8.0    Protein Urine Negative Negative mg/dl    Urobilinogen U Visit:  Requested Prescriptions      No prescriptions requested or ordered in this encounter     Continue current meds. Watch diet for fats and carbs. Stay active. Try Gummy fibers.   Imaging & Consults:  None    The patient indicates understanding of

## 2019-06-17 DIAGNOSIS — E53.8 B12 DEFICIENCY: ICD-10-CM

## 2019-06-17 DIAGNOSIS — J45.909 UNCOMPLICATED ASTHMA, UNSPECIFIED ASTHMA SEVERITY, UNSPECIFIED WHETHER PERSISTENT: ICD-10-CM

## 2019-06-17 DIAGNOSIS — E78.5 HYPERLIPIDEMIA, UNSPECIFIED HYPERLIPIDEMIA TYPE: ICD-10-CM

## 2019-06-17 DIAGNOSIS — F03.90 DEMENTIA WITHOUT BEHAVIORAL DISTURBANCE, UNSPECIFIED DEMENTIA TYPE (HCC): ICD-10-CM

## 2019-06-17 DIAGNOSIS — E55.9 VITAMIN D DEFICIENCY: Primary | ICD-10-CM

## 2019-06-24 RX ORDER — TOLTERODINE 4 MG/1
CAPSULE, EXTENDED RELEASE ORAL
Qty: 90 CAPSULE | Refills: 0 | Status: SHIPPED | OUTPATIENT
Start: 2019-06-24 | End: 2019-12-30

## 2019-06-25 ENCOUNTER — MED REC SCAN ONLY (OUTPATIENT)
Dept: FAMILY MEDICINE CLINIC | Facility: CLINIC | Age: 84
End: 2019-06-25

## 2019-06-25 NOTE — TELEPHONE ENCOUNTER
TOLTERODINE ER 4MG CAP    Non protocol medication. Please see pended medications. Please sign if appropriate. Thank you    Her last OV was on 06/14/2019.

## 2019-07-08 RX ORDER — MONTELUKAST SODIUM 10 MG/1
TABLET ORAL
Qty: 90 TABLET | Refills: 0 | Status: SHIPPED | OUTPATIENT
Start: 2019-07-08 | End: 2019-10-11

## 2019-07-15 RX ORDER — SIMVASTATIN 20 MG
TABLET ORAL
Qty: 90 TABLET | Refills: 0 | Status: SHIPPED | OUTPATIENT
Start: 2019-07-15 | End: 2019-10-18

## 2019-08-20 ENCOUNTER — TELEPHONE (OUTPATIENT)
Dept: FAMILY MEDICINE CLINIC | Facility: CLINIC | Age: 84
End: 2019-08-20

## 2019-08-20 NOTE — TELEPHONE ENCOUNTER
Received request for a PA to be completed for pt's Donepezil HCL 23mg tablets.   PA completed and sent to plan via covermeds

## 2019-08-20 NOTE — TELEPHONE ENCOUNTER
Letter of determination received from [facility] rajan, Donepezil HCL 23mg tabs have been approved 7/21/19-8/19-22. Called to [facility], spoke with reed Uriostegui. She voiced understanding and agreed to notify the pt.

## 2019-08-21 ENCOUNTER — MED REC SCAN ONLY (OUTPATIENT)
Dept: FAMILY MEDICINE CLINIC | Facility: CLINIC | Age: 84
End: 2019-08-21

## 2019-09-13 DIAGNOSIS — G30.1 LATE ONSET ALZHEIMER'S DISEASE WITHOUT BEHAVIORAL DISTURBANCE (HCC): ICD-10-CM

## 2019-09-13 DIAGNOSIS — F02.80 LATE ONSET ALZHEIMER'S DISEASE WITHOUT BEHAVIORAL DISTURBANCE (HCC): ICD-10-CM

## 2019-09-13 RX ORDER — MEMANTINE HYDROCHLORIDE 10 MG/1
TABLET ORAL
Qty: 180 TABLET | Refills: 1 | Status: SHIPPED | OUTPATIENT
Start: 2019-09-13 | End: 2020-12-01 | Stop reason: ALTCHOICE

## 2019-09-13 RX ORDER — DONEPEZIL HYDROCHLORIDE 23 MG/1
TABLET, FILM COATED ORAL
Qty: 90 TABLET | Refills: 1 | Status: SHIPPED | OUTPATIENT
Start: 2019-09-13 | End: 2020-04-22

## 2019-09-27 RX ORDER — IPRATROPIUM BROMIDE 21 UG/1
SPRAY, METERED NASAL
Qty: 1 BOTTLE | Refills: 2 | Status: SHIPPED | OUTPATIENT
Start: 2019-09-27 | End: 2020-03-03

## 2019-10-11 RX ORDER — MONTELUKAST SODIUM 10 MG/1
TABLET ORAL
Qty: 90 TABLET | Refills: 0 | Status: SHIPPED | OUTPATIENT
Start: 2019-10-11 | End: 2020-01-10

## 2019-10-18 RX ORDER — SIMVASTATIN 20 MG
TABLET ORAL
Qty: 90 TABLET | Refills: 0 | Status: SHIPPED | OUTPATIENT
Start: 2019-10-18 | End: 2020-01-07

## 2019-12-30 ENCOUNTER — OFFICE VISIT (OUTPATIENT)
Dept: FAMILY MEDICINE CLINIC | Facility: CLINIC | Age: 84
End: 2019-12-30
Payer: MEDICARE

## 2019-12-30 VITALS
TEMPERATURE: 97 F | DIASTOLIC BLOOD PRESSURE: 62 MMHG | WEIGHT: 185 LBS | HEART RATE: 76 BPM | BODY MASS INDEX: 32.78 KG/M2 | SYSTOLIC BLOOD PRESSURE: 92 MMHG | OXYGEN SATURATION: 94 % | RESPIRATION RATE: 18 BRPM | HEIGHT: 63 IN

## 2019-12-30 DIAGNOSIS — I95.9 HYPOTENSION, UNSPECIFIED HYPOTENSION TYPE: ICD-10-CM

## 2019-12-30 DIAGNOSIS — M81.0 AGE-RELATED OSTEOPOROSIS WITHOUT CURRENT PATHOLOGICAL FRACTURE: ICD-10-CM

## 2019-12-30 DIAGNOSIS — J45.30 MILD PERSISTENT ASTHMA WITHOUT COMPLICATION: Primary | ICD-10-CM

## 2019-12-30 DIAGNOSIS — E78.00 PURE HYPERCHOLESTEROLEMIA: ICD-10-CM

## 2019-12-30 DIAGNOSIS — F02.80 LATE ONSET ALZHEIMER'S DISEASE WITHOUT BEHAVIORAL DISTURBANCE (HCC): ICD-10-CM

## 2019-12-30 DIAGNOSIS — N39.41 URGE INCONTINENCE OF URINE: ICD-10-CM

## 2019-12-30 DIAGNOSIS — R09.81 NASAL CONGESTION: ICD-10-CM

## 2019-12-30 DIAGNOSIS — G30.1 LATE ONSET ALZHEIMER'S DISEASE WITHOUT BEHAVIORAL DISTURBANCE (HCC): ICD-10-CM

## 2019-12-30 PROCEDURE — 99214 OFFICE O/P EST MOD 30 MIN: CPT | Performed by: FAMILY MEDICINE

## 2019-12-30 NOTE — PATIENT INSTRUCTIONS
Decrease donepezil to half of the tablet for 2- 3 weeks and then stop. Continue memantine for the next 5 weeks and then stop. Continue other medications inhalers. Return for fasting blood work. Encourage hydration. Use saltine crackers as needed.     Lucian Salinas

## 2019-12-30 NOTE — PROGRESS NOTES
Crispin Dickey is a 80year old female. cc hyperlipidemia, asthma, allergic rhinitis, urgency incontinence, Alzheimer's dementia, hyperglycemia , nasal congestion  HPI:   Hyperlipidemia on medication doing well.  is trying to limit what she eats. Triamcinolone Acetonide 55 MCG/ACT Nasal Aerosol 1 spray by Nasal route daily.      • omega-3 fatty acids 1000 MG Oral Cap Daily     • albuterol sulfate (2.5 MG/3ML) 0.083% Inhalation Nebu Soln Take 3 mL (2.5 mg total) by nebulization every 4 (four) hours a Left arm, Patient Position: Sitting, Cuff Size: adult)   Pulse 76   Temp 97 °F (36.1 °C) (Oral)   Resp 18   Ht 63\"   Wt 185 lb (83.9 kg)   SpO2 94%   Breastfeeding No   BMI 32.77 kg/m²   GENERAL: well developed, well nourished,in no apparent distress, obe placed in this encounter. Meds & Refills for this Visit:  Requested Prescriptions      No prescriptions requested or ordered in this encounter   Decrease donepezil to half of the tablet for 2- 3 weeks and then stop.   Continue memantine for the next 5

## 2019-12-31 ENCOUNTER — MED REC SCAN ONLY (OUTPATIENT)
Dept: FAMILY MEDICINE CLINIC | Facility: CLINIC | Age: 84
End: 2019-12-31

## 2020-01-07 RX ORDER — SIMVASTATIN 20 MG
TABLET ORAL
Qty: 90 TABLET | Refills: 0 | Status: SHIPPED | OUTPATIENT
Start: 2020-01-07 | End: 2020-01-10

## 2020-01-10 ENCOUNTER — LAB ENCOUNTER (OUTPATIENT)
Dept: LAB | Age: 85
End: 2020-01-10
Attending: FAMILY MEDICINE
Payer: MEDICARE

## 2020-01-10 DIAGNOSIS — E53.8 B12 DEFICIENCY: ICD-10-CM

## 2020-01-10 DIAGNOSIS — E55.9 VITAMIN D DEFICIENCY: ICD-10-CM

## 2020-01-10 DIAGNOSIS — F03.90 DEMENTIA WITHOUT BEHAVIORAL DISTURBANCE, UNSPECIFIED DEMENTIA TYPE (HCC): ICD-10-CM

## 2020-01-10 DIAGNOSIS — J45.909 UNCOMPLICATED ASTHMA, UNSPECIFIED ASTHMA SEVERITY, UNSPECIFIED WHETHER PERSISTENT: ICD-10-CM

## 2020-01-10 DIAGNOSIS — E78.5 HYPERLIPIDEMIA, UNSPECIFIED HYPERLIPIDEMIA TYPE: ICD-10-CM

## 2020-01-10 LAB
ALBUMIN SERPL-MCNC: 3.4 G/DL (ref 3.4–5)
ALBUMIN/GLOB SERPL: 0.9 {RATIO} (ref 1–2)
ALP LIVER SERPL-CCNC: 55 U/L (ref 55–142)
ALT SERPL-CCNC: 18 U/L (ref 13–56)
ANION GAP SERPL CALC-SCNC: 5 MMOL/L (ref 0–18)
AST SERPL-CCNC: 17 U/L (ref 15–37)
BASOPHILS # BLD AUTO: 0.05 X10(3) UL (ref 0–0.2)
BASOPHILS NFR BLD AUTO: 0.8 %
BILIRUB SERPL-MCNC: 0.5 MG/DL (ref 0.1–2)
BUN BLD-MCNC: 21 MG/DL (ref 7–18)
BUN/CREAT SERPL: 18.9 (ref 10–20)
CALCIUM BLD-MCNC: 9.7 MG/DL (ref 8.5–10.1)
CHLORIDE SERPL-SCNC: 108 MMOL/L (ref 98–112)
CHOLEST SMN-MCNC: 163 MG/DL (ref ?–200)
CO2 SERPL-SCNC: 29 MMOL/L (ref 21–32)
CREAT BLD-MCNC: 1.11 MG/DL (ref 0.55–1.02)
DEPRECATED RDW RBC AUTO: 47.5 FL (ref 35.1–46.3)
EOSINOPHIL # BLD AUTO: 1.02 X10(3) UL (ref 0–0.7)
EOSINOPHIL NFR BLD AUTO: 16.6 %
ERYTHROCYTE [DISTWIDTH] IN BLOOD BY AUTOMATED COUNT: 14 % (ref 11–15)
GLOBULIN PLAS-MCNC: 3.6 G/DL (ref 2.8–4.4)
GLUCOSE BLD-MCNC: 85 MG/DL (ref 70–99)
HCT VFR BLD AUTO: 41 % (ref 35–48)
HDLC SERPL-MCNC: 97 MG/DL (ref 40–59)
HGB BLD-MCNC: 13.2 G/DL (ref 12–16)
IMM GRANULOCYTES # BLD AUTO: 0.01 X10(3) UL (ref 0–1)
IMM GRANULOCYTES NFR BLD: 0.2 %
LDLC SERPL CALC-MCNC: 52 MG/DL (ref ?–100)
LYMPHOCYTES # BLD AUTO: 2.48 X10(3) UL (ref 1–4)
LYMPHOCYTES NFR BLD AUTO: 40.3 %
M PROTEIN MFR SERPL ELPH: 7 G/DL (ref 6.4–8.2)
MCH RBC QN AUTO: 29.9 PG (ref 26–34)
MCHC RBC AUTO-ENTMCNC: 32.2 G/DL (ref 31–37)
MCV RBC AUTO: 93 FL (ref 80–100)
MONOCYTES # BLD AUTO: 0.65 X10(3) UL (ref 0.1–1)
MONOCYTES NFR BLD AUTO: 10.6 %
NEUTROPHILS # BLD AUTO: 1.94 X10 (3) UL (ref 1.5–7.7)
NEUTROPHILS # BLD AUTO: 1.94 X10(3) UL (ref 1.5–7.7)
NEUTROPHILS NFR BLD AUTO: 31.5 %
NONHDLC SERPL-MCNC: 66 MG/DL (ref ?–130)
OSMOLALITY SERPL CALC.SUM OF ELEC: 296 MOSM/KG (ref 275–295)
PATIENT FASTING Y/N/NP: YES
PATIENT FASTING Y/N/NP: YES
PLATELET # BLD AUTO: 158 10(3)UL (ref 150–450)
POTASSIUM SERPL-SCNC: 4.1 MMOL/L (ref 3.5–5.1)
RBC # BLD AUTO: 4.41 X10(6)UL (ref 3.8–5.3)
SODIUM SERPL-SCNC: 142 MMOL/L (ref 136–145)
T4 FREE SERPL-MCNC: 1.1 NG/DL (ref 0.8–1.7)
TRIGL SERPL-MCNC: 69 MG/DL (ref 30–149)
TSI SER-ACNC: 2 MIU/ML (ref 0.36–3.74)
VIT B12 SERPL-MCNC: 398 PG/ML (ref 193–986)
VIT D+METAB SERPL-MCNC: 39.3 NG/ML (ref 30–100)
VLDLC SERPL CALC-MCNC: 14 MG/DL (ref 0–30)
WBC # BLD AUTO: 6.2 X10(3) UL (ref 4–11)

## 2020-01-10 PROCEDURE — 84439 ASSAY OF FREE THYROXINE: CPT

## 2020-01-10 PROCEDURE — 80061 LIPID PANEL: CPT

## 2020-01-10 PROCEDURE — 84443 ASSAY THYROID STIM HORMONE: CPT

## 2020-01-10 PROCEDURE — 36415 COLL VENOUS BLD VENIPUNCTURE: CPT

## 2020-01-10 PROCEDURE — 85025 COMPLETE CBC W/AUTO DIFF WBC: CPT

## 2020-01-10 PROCEDURE — 82607 VITAMIN B-12: CPT

## 2020-01-10 PROCEDURE — 82306 VITAMIN D 25 HYDROXY: CPT

## 2020-01-10 PROCEDURE — 80053 COMPREHEN METABOLIC PANEL: CPT

## 2020-01-10 RX ORDER — SIMVASTATIN 20 MG
TABLET ORAL
Qty: 90 TABLET | Refills: 0 | Status: SHIPPED | OUTPATIENT
Start: 2020-01-10 | End: 2020-07-03

## 2020-01-10 RX ORDER — MONTELUKAST SODIUM 10 MG/1
TABLET ORAL
Qty: 90 TABLET | Refills: 0 | Status: SHIPPED | OUTPATIENT
Start: 2020-01-10 | End: 2020-04-03

## 2020-01-15 ENCOUNTER — APPOINTMENT (OUTPATIENT)
Dept: GENERAL RADIOLOGY | Facility: HOSPITAL | Age: 85
End: 2020-01-15
Attending: PHYSICIAN ASSISTANT
Payer: MEDICARE

## 2020-01-15 ENCOUNTER — HOSPITAL ENCOUNTER (EMERGENCY)
Facility: HOSPITAL | Age: 85
Discharge: HOME | End: 2020-01-15
Attending: EMERGENCY MEDICINE
Payer: MEDICARE

## 2020-01-15 ENCOUNTER — APPOINTMENT (OUTPATIENT)
Dept: CT IMAGING | Facility: HOSPITAL | Age: 85
End: 2020-01-15
Attending: EMERGENCY MEDICINE
Payer: MEDICARE

## 2020-01-15 VITALS
WEIGHT: 180 LBS | HEIGHT: 64 IN | OXYGEN SATURATION: 94 % | HEART RATE: 89 BPM | RESPIRATION RATE: 18 BRPM | DIASTOLIC BLOOD PRESSURE: 85 MMHG | SYSTOLIC BLOOD PRESSURE: 128 MMHG | TEMPERATURE: 97 F | BODY MASS INDEX: 30.73 KG/M2

## 2020-01-15 DIAGNOSIS — S70.02XA CONTUSION OF LEFT HIP, INITIAL ENCOUNTER: ICD-10-CM

## 2020-01-15 DIAGNOSIS — S01.01XA LACERATION OF OCCIPITAL REGION OF SCALP, INITIAL ENCOUNTER: ICD-10-CM

## 2020-01-15 DIAGNOSIS — S09.90XA INJURY OF HEAD, INITIAL ENCOUNTER: Primary | ICD-10-CM

## 2020-01-15 PROCEDURE — 73502 X-RAY EXAM HIP UNI 2-3 VIEWS: CPT | Performed by: PHYSICIAN ASSISTANT

## 2020-01-15 PROCEDURE — 72125 CT NECK SPINE W/O DYE: CPT | Performed by: PHYSICIAN ASSISTANT

## 2020-01-15 PROCEDURE — 12001 RPR S/N/AX/GEN/TRNK 2.5CM/<: CPT | Performed by: PHYSICIAN ASSISTANT

## 2020-01-15 PROCEDURE — 99284 EMERGENCY DEPT VISIT MOD MDM: CPT

## 2020-01-15 PROCEDURE — 70450 CT HEAD/BRAIN W/O DYE: CPT | Performed by: EMERGENCY MEDICINE

## 2020-01-15 PROCEDURE — 12001 RPR S/N/AX/GEN/TRNK 2.5CM/<: CPT

## 2020-01-15 NOTE — ED INITIAL ASSESSMENT (HPI)
PT had unwitnessed fall in shower. PT hit back of head and does have lac on posterior L head. PT on baby ASA daily. PT with history of dementia, neuro status at baseline.

## 2020-01-16 NOTE — ED PROVIDER NOTES
Patient Seen in: [facility] Emergency Department      History   Patient presents with:  Fall    Stated Complaint:     MIKI Puckett Albert is an 45-year-old female who presents today for evaluation of a head injury.   Her past medical history is listed below complaint:   Other systems are as noted in HPI. Constitutional and vital signs reviewed. All other systems reviewed and negative except as noted above. Physical Exam     ED Triage Vitals [01/15/20 1809]   /85   Pulse 74   Resp 17   Temp 97. 2 Ventricles and sulci are within normal limits. There is no midline shift or mass-effect. The basal cisterns are patent. The gray-white matter differentiation is intact. There is no acute intracranial hemorrhage or extra-axial fluid collection.   No evid C-collar. FINDINGS:   There is straightening of the cervical lordosis. The vertebral body heights are maintained. There is moderate multilevel degenerative disc disease.   There is scattered advanced facet and uncinate hypertrophy which results in vary transcribed by Technologist)  Per patient's family members: patient fell earlier today. She has left hip pain.          FINDINGS:  9.1 cm rounded opacity projecting over the pelvis could represent something extrinsic to the patient, with a large bladder amy initial encounter  (primary encounter diagnosis)  Laceration of occipital region of scalp, initial encounter    Disposition:  There is no disposition on file for this visit.   1/15/2020  8:31 pm    Follow-up:  MD Daphne Portillo [address]

## 2020-01-22 ENCOUNTER — OFFICE VISIT (OUTPATIENT)
Dept: FAMILY MEDICINE CLINIC | Facility: CLINIC | Age: 85
End: 2020-01-22
Payer: MEDICARE

## 2020-01-22 VITALS
BODY MASS INDEX: 30.73 KG/M2 | TEMPERATURE: 97 F | HEIGHT: 64 IN | OXYGEN SATURATION: 96 % | DIASTOLIC BLOOD PRESSURE: 52 MMHG | HEART RATE: 89 BPM | RESPIRATION RATE: 16 BRPM | WEIGHT: 180 LBS | SYSTOLIC BLOOD PRESSURE: 94 MMHG

## 2020-01-22 DIAGNOSIS — S01.01XA LACERATION OF OCCIPITAL SCALP, INITIAL ENCOUNTER: Primary | ICD-10-CM

## 2020-01-22 DIAGNOSIS — S09.90XA INJURY OF HEAD, INITIAL ENCOUNTER: ICD-10-CM

## 2020-01-22 DIAGNOSIS — S70.02XA CONTUSION OF LEFT HIP, INITIAL ENCOUNTER: ICD-10-CM

## 2020-01-22 PROCEDURE — 99214 OFFICE O/P EST MOD 30 MIN: CPT | Performed by: FAMILY MEDICINE

## 2020-01-23 NOTE — PROGRESS NOTES
Sam Tavarez is a 80year old female. cc laceration of the scalp, injury of the head, injury of the hip  HPI:   Patient is coming to the office for ER follow-up visit from January 15, 2019. Patient was brought by paramedics to the emergency room.   Her h DAILY IN THE EVENING 90 tablet 0   • SIMVASTATIN 20 MG Oral Tab TAKE 1 TABLET BY MOUTH ONCE DAILY IN THE EVENING 90 tablet 0   • IPRATROPIUM BROMIDE 0.03 % Nasal Solution USE 2 SPRAY(S) IN EACH NOSTRIL EVERY 12 HOURS 1 Bottle 2   • DONEPEZIL HCL 23 MG Oral ago    Alcohol use: No      Alcohol/week: 0.0 standard drinks    Drug use: No       REVIEW OF SYSTEMS:   GENERAL HEALTH: feels well otherwise  SKIN: denies any unusual skin lesions or rashes laceration of the posterior scalp,   HEENT no congestion no runny PATIENT STATED HISTORY: (As transcribed by Technologist)  Patient had unwitnessed fall in the shower. She fell and hit the back of her head with a laceration to the posterior left side of her skull.       FINDINGS:  Ventricles and sulci are within normal (As transcribed by Technologist)  Patient had unwitnessed fall in the shower. She fell and hit the back of her head with a laceration to the posterior left side of her skull. She present to CT in a C-collar.          FINDINGS:       There is straightening o large bladder calculus felt less likely. Clinical correlation recommended. No evidence of acute displaced fracture or dislocation   in the left hip.           Dictated by: Herman Noel MD on 1/15/2020 at 21:06       Approved by: Herman Noel MD on 1

## 2020-01-29 ENCOUNTER — APPOINTMENT (OUTPATIENT)
Dept: CT IMAGING | Age: 85
End: 2020-01-29
Attending: PHYSICIAN ASSISTANT
Payer: MEDICARE

## 2020-01-29 ENCOUNTER — TELEPHONE (OUTPATIENT)
Dept: FAMILY MEDICINE CLINIC | Facility: CLINIC | Age: 85
End: 2020-01-29

## 2020-01-29 ENCOUNTER — HOSPITAL ENCOUNTER (OUTPATIENT)
Age: 85
Discharge: HOME | End: 2020-01-29
Payer: MEDICARE

## 2020-01-29 VITALS
TEMPERATURE: 98 F | OXYGEN SATURATION: 96 % | SYSTOLIC BLOOD PRESSURE: 122 MMHG | HEART RATE: 104 BPM | DIASTOLIC BLOOD PRESSURE: 76 MMHG | RESPIRATION RATE: 16 BRPM

## 2020-01-29 DIAGNOSIS — M25.552 LEFT HIP PAIN: Primary | ICD-10-CM

## 2020-01-29 PROCEDURE — 99214 OFFICE O/P EST MOD 30 MIN: CPT

## 2020-01-29 PROCEDURE — 73700 CT LOWER EXTREMITY W/O DYE: CPT | Performed by: PHYSICIAN ASSISTANT

## 2020-01-29 PROCEDURE — 76376 3D RENDER W/INTRP POSTPROCES: CPT | Performed by: PHYSICIAN ASSISTANT

## 2020-01-29 RX ORDER — LIDOCAINE 4 G/G
1 PATCH TOPICAL EVERY 24 HOURS
Qty: 5 PATCH | Refills: 0 | Status: SHIPPED | OUTPATIENT
Start: 2020-01-29 | End: 2020-02-24

## 2020-01-29 NOTE — ED NOTES
Pt came in per own wheelchair accompanied by family. Per family pt fell backwards in the tub and had a laceration . Pt as taken to [facility] ER had CT and xrays done.  Per family  For 2 days now pt has been c/o pain on the left leg, normally uses a walker prior t

## 2020-01-29 NOTE — ED PROVIDER NOTES
Patient Seen in: THE [facility] [place] [facility] END      History   Patient presents with:  Leg Pain  Fall    Stated Complaint: pain,buttocks    HPI    CHIEF COMPLAINT: Persistent left hip, buttocks pain    HISTORY OF PRESENT ILLNESS: Patient is a pleas above.    No pertinent past medical history. No pertinent past surgical history. Family history reviewed with patient/caregiver and is not pertinent to presenting problem. No pertinent social history.             Review of Systems ischemic changes in the cerebral white matter. 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 If there is clinical concern for acute ischemia/infarction, an MRI of the brain would be recommended for further evaluation.    Dictated by: Major Mckenna Comprehensive verbal and written discharge and follow-up instructions were provided to help prevent relapse or worsening. I discussed the case with the patient and they had no questions, complaints, or concerns.   Patient states they understand diagnosis,

## 2020-01-29 NOTE — ED INITIAL ASSESSMENT (HPI)
Left buttock pain -  Pain when putting weight on the left leg. States pain on the left buttocks . On Cj 15 pt fell in the tub backwards. Pt went to the ER had CT and xray done had staples on the head.   Per family  Pain started 2 days ago, prior to the fa

## 2020-01-29 NOTE — TELEPHONE ENCOUNTER
Spoke to [facility] [place] and he will take her to urgent care for evaluation and imaging.

## 2020-01-29 NOTE — TELEPHONE ENCOUNTER
1. What are your symptoms? Left side buttock is still hurting after fall. When she woke up this morning, her  had to get her into a wheelchair b/c she couldn't walk      2. How long have you been having these symptoms?     Since fall on 1/15 - pt

## 2020-01-29 NOTE — TELEPHONE ENCOUNTER
Spoke to . He said the pain is in the same area (left buttock) since fell. No fracture on XR. Today she is unable to bear weight on that leg as the pain is bad-but only in that area.  When she sits still she's ok, but bearing weight bad, he had to ta

## 2020-01-30 RX ORDER — DICLOFENAC SODIUM 75 MG/1
75 TABLET, DELAYED RELEASE ORAL 2 TIMES DAILY
Qty: 30 TABLET | Refills: 0 | Status: SHIPPED | OUTPATIENT
Start: 2020-01-30 | End: 2020-02-24

## 2020-01-30 NOTE — TELEPHONE ENCOUNTER
The steroid injection would be good if he would like to have it done before the your trip that would be good. If wanted to get a steroid injection do not start diclofenac yet.   If the steroid injection would be after their trip start diclofenac right now

## 2020-01-30 NOTE — TELEPHONE ENCOUNTER
Since patient is using Aleve, we can try diclofenac. They would have to use the first tablet with caution and monitor patient closely.

## 2020-01-30 NOTE — TELEPHONE ENCOUNTER
calling back to see what next steps should be following up on CT and XRAY. Patient is in quite a bit of pain and he currently has her in a wheel chair. Would like Dr. Marlow Mom to advise.

## 2020-01-30 NOTE — TELEPHONE ENCOUNTER
called back right as this response came back from Dr Nayan Prabhakar. Advised him of below. Reports she was taking aleve and tylenol without relief. Given a pain patch yesterday. Hoping it helps. Agrees to diclofenac.   I advised to stop the aleve while [place]

## 2020-01-30 NOTE — TELEPHONE ENCOUNTER
Discussed with spouse the allergy to ibu with diclofenac. He reports she had a rash several years ago while out in the sun and was taking ibu at that time. Has just avoided it since. I explained aleve is actually similar to the ibuprofen.  Reports she has

## 2020-01-30 NOTE — TELEPHONE ENCOUNTER
calling to speak to nurse. Would like to maybe get an injection for the pain (she has had this in the past). Pt is leaving out of town on Saturday. Ph:  [phone] ( Amado Smiling).

## 2020-01-30 NOTE — TELEPHONE ENCOUNTER
We can call diclofenac 75 mg 1 tablet twice a day number 30 tablets take it with food because it could upset the stomach this is anti-inflammatory medication I hope it will make her comfortable during her trip.

## 2020-01-30 NOTE — TELEPHONE ENCOUNTER
Per pt's  he wants to know if you are ok with her getting sterois injection. She had done before with Dr. Lendell Cowden and pain went away. He is ok with her taking the Diclofenac, but wanted your opinion. Please advise.

## 2020-01-31 ENCOUNTER — OFFICE VISIT (OUTPATIENT)
Dept: PAIN CLINIC | Facility: CLINIC | Age: 85
End: 2020-01-31
Payer: MEDICARE

## 2020-01-31 VITALS
BODY MASS INDEX: 30.73 KG/M2 | OXYGEN SATURATION: 96 % | WEIGHT: 180 LBS | SYSTOLIC BLOOD PRESSURE: 108 MMHG | HEART RATE: 92 BPM | DIASTOLIC BLOOD PRESSURE: 69 MMHG | HEIGHT: 64 IN

## 2020-01-31 DIAGNOSIS — Z91.81 HISTORY OF RECENT FALL: ICD-10-CM

## 2020-01-31 DIAGNOSIS — R10.32 LEFT INGUINAL PAIN: ICD-10-CM

## 2020-01-31 DIAGNOSIS — M54.16 LUMBAR RADICULITIS: ICD-10-CM

## 2020-01-31 DIAGNOSIS — M53.3 SACRAL PAIN: Primary | ICD-10-CM

## 2020-01-31 DIAGNOSIS — R10.2 PELVIC PAIN: ICD-10-CM

## 2020-01-31 PROCEDURE — 99203 OFFICE O/P NEW LOW 30 MIN: CPT | Performed by: NURSE PRACTITIONER

## 2020-01-31 RX ORDER — GABAPENTIN 100 MG/1
100 CAPSULE ORAL EVERY 12 HOURS
Qty: 60 CAPSULE | Refills: 0 | Status: SHIPPED | OUTPATIENT
Start: 2020-01-31 | End: 2020-02-24

## 2020-01-31 RX ORDER — METHYLPREDNISOLONE 4 MG/1
TABLET ORAL
Qty: 1 KIT | Refills: 0 | Status: SHIPPED | OUTPATIENT
Start: 2020-01-31 | End: 2020-12-10 | Stop reason: ALTCHOICE

## 2020-01-31 NOTE — PROGRESS NOTES
PHYSICAL EXAM:   Physical Exam   Constitutional:             Patient presents in office today with reported pain in left buttock with weight bearing. Patient point to pain going from the buttock to the hip and the groin.      Current pain level reported =

## 2020-01-31 NOTE — PROGRESS NOTES
PHYSICAL EXAM:   Physical Exam    Patient presents in office today with reported pain in ***    Current pain level reported = ***/10    Last reported dose of ***

## 2020-01-31 NOTE — H&P
Name: Alan Sorensen   : 1932   DOS: 2020     Chief complaint: pain    History of present illness:  Alan Sorensen is a 80year old female complaining of left groin and buttock pain after a fall into her bathtub approximately two weeks ago. methylPREDNISolone (MEDROL) 4 MG Oral Tablet Therapy Pack Take as directed 1 kit 0   • gabapentin 100 MG Oral Cap Take 1 capsule (100 mg total) by mouth Q12H. 60 capsule 0   • Diclofenac Sodium 75 MG Oral Tab EC Take 1 tablet (75 mg total) by mouth 2 (two) Age of Onset   • Cancer Father         colon     Social History    Tobacco Use      Smoking status: Former Smoker        Packs/day: 0.50        Years: 50.00        Pack years: 25        Types: Cigarettes, Cigars      Smokeless tobacco: Never Used      Verna renderings are generated on an independent CT scanner workstation. Dose reduction techniques were used.  Dose information is transmitted to the [facility] of Radiology) Rocio Goncalves [address] ([address]) which includes the Dose Index Daisy projecting over the pelvis could represent something extrinsic to the patient, with a large bladder calculus felt less likely. Clinical correlation recommended. No evidence of acute displaced fracture or dislocation   in the left hip.           Dictated b Assessment:  Sacral pain  (primary encounter diagnosis)  History of recent fall  Lumbar radiculitis  Pelvic pain  Left inguinal pain. Plan:   MRI lumbar, sacrum/coccyx, and pelvis.  We will call patient with final recommendations once MRI results h

## 2020-02-12 ENCOUNTER — TELEPHONE (OUTPATIENT)
Dept: SURGERY | Facility: CLINIC | Age: 85
End: 2020-02-12

## 2020-02-12 NOTE — TELEPHONE ENCOUNTER
Received a call from Nargis from [facility] radiology. She asks if MRI Pelvis and MRI Sacrum can be combined to reduce the length of time patient has to lay on table.  Dicussed with Nargis GAN and advised Nargis @ [facility] radiology that MRI's may be combined as long as f

## 2020-02-14 ENCOUNTER — TELEPHONE (OUTPATIENT)
Dept: SURGERY | Facility: CLINIC | Age: 85
End: 2020-02-14

## 2020-02-14 DIAGNOSIS — M54.16 LUMBAR RADICULITIS: Primary | ICD-10-CM

## 2020-02-14 DIAGNOSIS — M53.3 SACRAL PAIN: ICD-10-CM

## 2020-02-14 DIAGNOSIS — Z91.81 HISTORY OF RECENT FALL: ICD-10-CM

## 2020-02-14 DIAGNOSIS — R10.2 PAIN IN PELVIS: ICD-10-CM

## 2020-02-14 NOTE — TELEPHONE ENCOUNTER
Pt was on steroid pack & did well but it ended 2/7/20. And since then she has been in a lot pain. Tried to do the MRIs but she couldn't get them done because uncomfortable & moving, will need sedation. But will do that when she gets back.   Please put order

## 2020-02-14 NOTE — TELEPHONE ENCOUNTER
Spoke with patient's daughter [facility]. She states patient is in [place] and continues to complain of pain , requesting another medrol dose felipe. [facility] states patient did well on the medrol dose felipe last week and wondering if she can have another one.  Advised

## 2020-02-17 ENCOUNTER — TELEPHONE (OUTPATIENT)
Dept: FAMILY MEDICINE CLINIC | Facility: CLINIC | Age: 85
End: 2020-02-17

## 2020-02-17 RX ORDER — METHYLPREDNISOLONE 4 MG/1
TABLET ORAL
Qty: 1 KIT | Refills: 0 | Status: SHIPPED | OUTPATIENT
Start: 2020-02-17 | End: 2020-02-24

## 2020-02-17 NOTE — TELEPHONE ENCOUNTER
Please read phone message from GuideEvan Hdez 6 on 2/14/20. Patient Daughter called and is asking for approval for sedation of Patient down in [place]. States Patient cannot walk in pain and having trouble going on commode.   States she has Alzheimer's and wo

## 2020-02-17 NOTE — TELEPHONE ENCOUNTER
Sorry, I cannot give an order for sedation the patient and mother states that I have never seen.   This either have to wait for Dr. Eva Phillips or possibly if they can find a provider in [place] would be willing to do that

## 2020-02-17 NOTE — TELEPHONE ENCOUNTER
Usually the order for MRI would specify. If it is with sedation of oversedation. I think daughter Orquidea Mas should contact ordering doctor for MRI and change the order to MRI  with sedation.      I think that would be  only way to get MRI done on this patient

## 2020-02-17 NOTE — TELEPHONE ENCOUNTER
Sent script for Medrol dosepack, they can request it to be transferred to a pharmacy in their location. I will also change the MRIs to with sedation. Thanks!

## 2020-02-17 NOTE — TELEPHONE ENCOUNTER
Spoke to Danilo valencia, pt's daughter, who states her sisters are now caring for her mother in [place]. Danilo valencia reports that pt was unable to go to ED or UC d/t immobility from pain. Therefore, she did not obtain a medrol dose felipe.  Janice Layton that a script has been se

## 2020-02-17 NOTE — TELEPHONE ENCOUNTER
Spoke to Ludin Rodriguez to inform her that she can contact the [facility] help number. Otherwise, MRI orders can be faxed directly to the facility where pt would have them completed in [place] if she has to perform them in [place].  Ludin Rodriguez verbalized understanding and states she

## 2020-02-22 ENCOUNTER — TELEPHONE (OUTPATIENT)
Dept: FAMILY MEDICINE CLINIC | Facility: CLINIC | Age: 85
End: 2020-02-22

## 2020-02-22 NOTE — TELEPHONE ENCOUNTER
[address] [place] APRN/ received page from pt's daughter/yasmine-listed on hipaa consent. Call to yasmine-reports pt was visiting other daughter in [place], [place]-hospitalized unexpectedly for pneumonia and adrenal insufficiency.   sts hosp is planning pt's dischar matter. Updated re all above info. She agrees w recommendations provided to Dorita above.

## 2020-02-24 RX ORDER — SODIUM CHLORIDE, SODIUM LACTATE, POTASSIUM CHLORIDE, CALCIUM CHLORIDE 600; 310; 30; 20 MG/100ML; MG/100ML; MG/100ML; MG/100ML
INJECTION, SOLUTION INTRAVENOUS CONTINUOUS
Status: CANCELLED | OUTPATIENT
Start: 2020-02-24

## 2020-02-24 RX ORDER — AMOXICILLIN AND CLAVULANATE POTASSIUM 250; 125 MG/1; MG/1
1 TABLET, FILM COATED ORAL EVERY 8 HOURS SCHEDULED
COMMUNITY
End: 2020-02-26 | Stop reason: ALTCHOICE

## 2020-02-24 NOTE — TELEPHONE ENCOUNTER
Pt's daughter Devante Saenz is calling regarding how to ween her mom off the steroid pack more slowly. She stated they were not given specific directions from the hospital in [facility] where she was hospitalized. She is looking for A Xavier's assistance.  Please main

## 2020-02-24 NOTE — TELEPHONE ENCOUNTER
Spoke with Nasreen Ansari who stated Patient passed out last Tuesday in [place] and was in the hospital for a few days, Tuesday through Sunday, was discharged Sunday.  Nasreen Ansari stated she does not know if a definitive diagnosis was made but she was informed patient did n

## 2020-02-24 NOTE — TELEPHONE ENCOUNTER
EDWINI-  Spoke with daughter Ricardo Klein to discuss subacute rehab information. Per Alec Bocanegra ( at [facility]) pt is to be given a list of local subacute rehab facilities.   She states that pt family will need to choose a location, contact that locatio

## 2020-02-25 ENCOUNTER — TELEPHONE (OUTPATIENT)
Dept: FAMILY MEDICINE CLINIC | Facility: CLINIC | Age: 85
End: 2020-02-25

## 2020-02-25 NOTE — TELEPHONE ENCOUNTER
Patient's Daughter Cora Regan called. Received Verbal Authorization from  Shari Archuleta to speak with Cora Regan. Patient has Alzheimers and is getting an [place] 2/27/20. She needs to be sedated and they MRI staff said she needs clearance from her Primary.

## 2020-02-25 NOTE — TELEPHONE ENCOUNTER
Spoke with  Hannah Calderon and daughter Namrata Parish to advise them that we did receive the records from hospital in [place] and that Dr. Sena Peng will only be addressing medical clearance for sedation for MRI at tomorrows appt.   Namrata Parish voiced understanding and co

## 2020-02-25 NOTE — TELEPHONE ENCOUNTER
Unfortunately patient will be double book tomorrow so I cannot do the hospital follow-up and I cannot discuss all the problems after the hospitalization tomorrow I will focus on making sure that her respiratory status is good for her to undergo the MRI und

## 2020-02-25 NOTE — TELEPHONE ENCOUNTER
Spoke with Francheska Luther ( at [facility]), she states she will reach out to [facility] to inquire what specific criteria pt did not meet to be able to be treated there.  Francheska Luther states she will then call pt  and myself to advise us of what she f

## 2020-02-25 NOTE — TELEPHONE ENCOUNTER
If radiology requires clearance from us please obtain the request for clearance and see what exactly they are looking for. Also if  patient needs to be cleared by me we need to get the notes from patient hospitalization.  My understanding is that she was h

## 2020-02-25 NOTE — TELEPHONE ENCOUNTER
Call to [facility]/brian RN-advised if pt needs PCP clearance for 2020 MRI w sedation, we need that request faxed to us w pt name, , planned procedure/sedation and what they need from dr James Calderon.    Request this info be faxed to my attenti

## 2020-02-25 NOTE — TELEPHONE ENCOUNTER
Spoke with  Anila Casper, daughter Mike Marmolejo, and Daughter Juan M on a conference call. See TE from 2/24/20 for documentation regarding Sub-acute rehab. As for medical clearance for MRI pt is scheduled to have on 2/27/20.  Advised family of Dr. Cait Miranda

## 2020-02-25 NOTE — TELEPHONE ENCOUNTER
Spoke with  Chiqui Perkins, daughter Alyssa Allred, and Daughter Efrain Chandler on a conference call. Per [facility] advised her that pt \"did not qualify for rehab based on notes received from [facility] in [facility]"  Family would like to know what to do from here.   I

## 2020-02-26 ENCOUNTER — OFFICE VISIT (OUTPATIENT)
Dept: FAMILY MEDICINE CLINIC | Facility: CLINIC | Age: 85
End: 2020-02-26
Payer: MEDICARE

## 2020-02-26 ENCOUNTER — HOSPITAL ENCOUNTER (OUTPATIENT)
Dept: GENERAL RADIOLOGY | Age: 85
Discharge: HOME | End: 2020-02-26
Attending: FAMILY MEDICINE
Payer: MEDICARE

## 2020-02-26 ENCOUNTER — ANESTHESIA EVENT (OUTPATIENT)
Dept: MRI IMAGING | Facility: HOSPITAL | Age: 85
End: 2020-02-26
Payer: MEDICARE

## 2020-02-26 ENCOUNTER — LAB ENCOUNTER (OUTPATIENT)
Dept: LAB | Age: 85
End: 2020-02-26
Attending: FAMILY MEDICINE
Payer: MEDICARE

## 2020-02-26 ENCOUNTER — HOSPITAL ENCOUNTER (OUTPATIENT)
Dept: ULTRASOUND IMAGING | Age: 85
Discharge: HOME | End: 2020-02-26
Attending: FAMILY MEDICINE
Payer: MEDICARE

## 2020-02-26 VITALS
TEMPERATURE: 97 F | BODY MASS INDEX: 30.73 KG/M2 | RESPIRATION RATE: 18 BRPM | HEART RATE: 56 BPM | HEIGHT: 64 IN | DIASTOLIC BLOOD PRESSURE: 56 MMHG | OXYGEN SATURATION: 96 % | WEIGHT: 180 LBS | SYSTOLIC BLOOD PRESSURE: 96 MMHG

## 2020-02-26 DIAGNOSIS — M54.16 LUMBAR RADICULITIS: ICD-10-CM

## 2020-02-26 DIAGNOSIS — Z85.3 HISTORY OF LEFT BREAST CANCER: ICD-10-CM

## 2020-02-26 DIAGNOSIS — R53.1 GENERALIZED WEAKNESS: ICD-10-CM

## 2020-02-26 DIAGNOSIS — J45.30 MILD PERSISTENT ASTHMA WITHOUT COMPLICATION: ICD-10-CM

## 2020-02-26 DIAGNOSIS — J18.9 PNEUMONIA OF RIGHT LOWER LOBE DUE TO INFECTIOUS ORGANISM: ICD-10-CM

## 2020-02-26 DIAGNOSIS — F02.80 LATE ONSET ALZHEIMER'S DISEASE WITHOUT BEHAVIORAL DISTURBANCE (HCC): ICD-10-CM

## 2020-02-26 DIAGNOSIS — J18.9 PNEUMONIA OF RIGHT LOWER LOBE DUE TO INFECTIOUS ORGANISM: Primary | ICD-10-CM

## 2020-02-26 DIAGNOSIS — M79.604 ACUTE LEG PAIN, RIGHT: ICD-10-CM

## 2020-02-26 DIAGNOSIS — E78.00 PURE HYPERCHOLESTEROLEMIA: ICD-10-CM

## 2020-02-26 DIAGNOSIS — R55 SYNCOPE AND COLLAPSE: ICD-10-CM

## 2020-02-26 DIAGNOSIS — R26.2 UNABLE TO AMBULATE: ICD-10-CM

## 2020-02-26 DIAGNOSIS — G30.1 LATE ONSET ALZHEIMER'S DISEASE WITHOUT BEHAVIORAL DISTURBANCE (HCC): ICD-10-CM

## 2020-02-26 DIAGNOSIS — M81.0 AGE-RELATED OSTEOPOROSIS WITHOUT CURRENT PATHOLOGICAL FRACTURE: ICD-10-CM

## 2020-02-26 DIAGNOSIS — E55.9 VITAMIN D DEFICIENCY: ICD-10-CM

## 2020-02-26 LAB
ALBUMIN SERPL-MCNC: 3 G/DL (ref 3.4–5)
ALBUMIN/GLOB SERPL: 0.7 {RATIO} (ref 1–2)
ALP LIVER SERPL-CCNC: 115 U/L (ref 55–142)
ALT SERPL-CCNC: 21 U/L (ref 13–56)
ANION GAP SERPL CALC-SCNC: 3 MMOL/L (ref 0–18)
AST SERPL-CCNC: 13 U/L (ref 15–37)
BASOPHILS # BLD AUTO: 0.04 X10(3) UL (ref 0–0.2)
BASOPHILS NFR BLD AUTO: 0.5 %
BILIRUB SERPL-MCNC: 0.4 MG/DL (ref 0.1–2)
BUN BLD-MCNC: 25 MG/DL (ref 7–18)
BUN/CREAT SERPL: 24 (ref 10–20)
CALCIUM BLD-MCNC: 9.5 MG/DL (ref 8.5–10.1)
CHLORIDE SERPL-SCNC: 105 MMOL/L (ref 98–112)
CO2 SERPL-SCNC: 31 MMOL/L (ref 21–32)
CREAT BLD-MCNC: 1.04 MG/DL (ref 0.55–1.02)
DEPRECATED RDW RBC AUTO: 49.9 FL (ref 35.1–46.3)
EOSINOPHIL # BLD AUTO: 0.41 X10(3) UL (ref 0–0.7)
EOSINOPHIL NFR BLD AUTO: 5.2 %
ERYTHROCYTE [DISTWIDTH] IN BLOOD BY AUTOMATED COUNT: 14.6 % (ref 11–15)
GLOBULIN PLAS-MCNC: 4.3 G/DL (ref 2.8–4.4)
GLUCOSE BLD-MCNC: 80 MG/DL (ref 70–99)
HCT VFR BLD AUTO: 42.3 % (ref 35–48)
HGB BLD-MCNC: 13.3 G/DL (ref 12–16)
IMM GRANULOCYTES # BLD AUTO: 0.05 X10(3) UL (ref 0–1)
IMM GRANULOCYTES NFR BLD: 0.6 %
LYMPHOCYTES # BLD AUTO: 1.9 X10(3) UL (ref 1–4)
LYMPHOCYTES NFR BLD AUTO: 24 %
M PROTEIN MFR SERPL ELPH: 7.3 G/DL (ref 6.4–8.2)
MCH RBC QN AUTO: 29.6 PG (ref 26–34)
MCHC RBC AUTO-ENTMCNC: 31.4 G/DL (ref 31–37)
MCV RBC AUTO: 94.2 FL (ref 80–100)
MONOCYTES # BLD AUTO: 1.1 X10(3) UL (ref 0.1–1)
MONOCYTES NFR BLD AUTO: 13.9 %
NEUTROPHILS # BLD AUTO: 4.42 X10 (3) UL (ref 1.5–7.7)
NEUTROPHILS # BLD AUTO: 4.42 X10(3) UL (ref 1.5–7.7)
NEUTROPHILS NFR BLD AUTO: 55.8 %
OSMOLALITY SERPL CALC.SUM OF ELEC: 291 MOSM/KG (ref 275–295)
PATIENT FASTING Y/N/NP: NO
PLATELET # BLD AUTO: 255 10(3)UL (ref 150–450)
POTASSIUM SERPL-SCNC: 4.1 MMOL/L (ref 3.5–5.1)
RBC # BLD AUTO: 4.49 X10(6)UL (ref 3.8–5.3)
SODIUM SERPL-SCNC: 139 MMOL/L (ref 136–145)
WBC # BLD AUTO: 7.9 X10(3) UL (ref 4–11)

## 2020-02-26 PROCEDURE — 1111F DSCHRG MED/CURRENT MED MERGE: CPT | Performed by: FAMILY MEDICINE

## 2020-02-26 PROCEDURE — 99215 OFFICE O/P EST HI 40 MIN: CPT | Performed by: FAMILY MEDICINE

## 2020-02-26 PROCEDURE — 71046 X-RAY EXAM CHEST 2 VIEWS: CPT | Performed by: FAMILY MEDICINE

## 2020-02-26 PROCEDURE — 36415 COLL VENOUS BLD VENIPUNCTURE: CPT

## 2020-02-26 PROCEDURE — 93971 EXTREMITY STUDY: CPT | Performed by: FAMILY MEDICINE

## 2020-02-26 PROCEDURE — 80053 COMPREHEN METABOLIC PANEL: CPT

## 2020-02-26 PROCEDURE — 85025 COMPLETE CBC W/AUTO DIFF WBC: CPT

## 2020-02-26 RX ORDER — AMOXICILLIN AND CLAVULANATE POTASSIUM 875; 125 MG/1; MG/1
1 TABLET, FILM COATED ORAL EVERY 12 HOURS
COMMUNITY
Start: 2020-02-24 | End: 2020-03-03

## 2020-02-26 NOTE — IMAGING NOTE
Spoke to patient's  over the phone. Reports patient was diagnosed with pneumonia, went to Dr. Ivan Aguayo  today for clearance prior to MRI with anesthesia tomorrow. He states they are currently at [facility].  Discussed she may need to reschedule

## 2020-02-27 ENCOUNTER — HOSPITAL ENCOUNTER (OUTPATIENT)
Dept: MRI IMAGING | Facility: HOSPITAL | Age: 85
Discharge: HOME | End: 2020-02-27
Attending: NURSE PRACTITIONER
Payer: MEDICARE

## 2020-02-27 ENCOUNTER — ANESTHESIA (OUTPATIENT)
Dept: MRI IMAGING | Facility: HOSPITAL | Age: 85
End: 2020-02-27
Payer: MEDICARE

## 2020-02-27 VITALS
TEMPERATURE: 98 F | BODY MASS INDEX: 25.9 KG/M2 | RESPIRATION RATE: 18 BRPM | OXYGEN SATURATION: 95 % | HEART RATE: 68 BPM | HEIGHT: 67 IN | DIASTOLIC BLOOD PRESSURE: 70 MMHG | SYSTOLIC BLOOD PRESSURE: 122 MMHG | WEIGHT: 165 LBS

## 2020-02-27 DIAGNOSIS — M53.3 SACRAL PAIN: ICD-10-CM

## 2020-02-27 DIAGNOSIS — M54.16 LUMBAR RADICULITIS: ICD-10-CM

## 2020-02-27 DIAGNOSIS — R10.2 PAIN IN PELVIS: ICD-10-CM

## 2020-02-27 DIAGNOSIS — Z91.81 HISTORY OF RECENT FALL: ICD-10-CM

## 2020-02-27 PROCEDURE — 72195 MRI PELVIS W/O DYE: CPT | Performed by: NURSE PRACTITIONER

## 2020-02-27 PROCEDURE — 72148 MRI LUMBAR SPINE W/O DYE: CPT | Performed by: NURSE PRACTITIONER

## 2020-02-27 RX ORDER — SODIUM CHLORIDE 9 MG/ML
INJECTION, SOLUTION INTRAVENOUS CONTINUOUS
Status: CANCELLED | OUTPATIENT
Start: 2020-02-27

## 2020-02-27 NOTE — ANESTHESIA PREPROCEDURE EVALUATION
PRE-OP EVALUATION    Patient Name: Nicola Cantu    Pre-op Diagnosis: lumbar radiculitis    MRI lumbar spine, pelvis and sacrum/coccyx     Pre-op vitals reviewed.   Temp: 97.2 °F (36.2 °C)  Pulse: 56  Resp: 18  BP: 96/56  SpO2: 96 %  Body mass index is 25 EYE-TRINITY PLUS LUTEIN OR, 1 TABLET DAILY , Disp: , Rfl:   CITRACAL + D OR, BID, Disp: , Rfl:         Allergies: Ibuprofen; Seasonal; Sulfa Antibiotics      Anesthesia Evaluation    Patient summary reviewed.     Anesthetic Complications  (-) history of anes 02/26/2020    GLU 80 02/26/2020    CA 9.5 02/26/2020            Airway      Mallampati: III  Mouth opening: 3 FB  TM distance: 4 - 6 cm  Neck ROM: limited Cardiovascular      Rhythm: regular  Rate: normal     Dental             Pulmonary          (+) rhonc

## 2020-02-27 NOTE — PROGRESS NOTES
Madison Parker is a 80year old female. cc pneumonia hospitalization follow-up, generalized weakness, right leg pain, low back pain status post fall, hyperlipidemia, asthma, Alzheimer's disease  HPI:   Patient is coming to the office for follow-up visit. is complaining of having pain in her right side and in the right leg. Her leg is very tender on palpation today. Ultrasound was checked to make sure that she does not have DVT. Patient has no history of asthma.   She right now at her baseline using her puffs into the lungs every 4 (four) hours as needed for Wheezing. • loratadine (CLARITIN) 10 MG Oral Tab Take 10 mg by mouth daily. • EYE-TRINITY PLUS LUTEIN OR 1 TABLET DAILY      • CITRACAL + D OR BID     • ASPIRIN Take 81 mg by mouth daily. SKIN: no rashes,no suspicious lesions  HEENT: atraumatic, normocephalic,ears and throat are clear  NECK: supple,no adenopathy,  LUNGS: clear to auscultation  CARDIO: RRR without murmur  GI: good BS's,no masses, HSM or tenderness  EXTREMITIES: Trace edema by Technologist)  Patient's family member states Carlota Lewis was hospitalized last week for fainting. She was diagnosed with pneumonia and they are here for her follow up so that she will be cleared for a MRI.           FINDINGS:    LUNGS:  There is mild bibasila any pneumonia. Acute pain on the right leg patient had very high d-dimer at 1064 in [place] we  got ultrasound of her leg which was negative for DVT. Her pain may be related to her back problem which will be evaluated with MRI tomorrow.     Generalized we chest for PE no evidence for pulmonary emboli bilateral atelectasis small right pleural effusion cardiomegaly.   Chest x-ray February 20, 2020 alveolar consolidation the right lung base consistent with pneumonia small right pleural effusion lesser amount of Time spent was 45min, more than 50% was spent on counseling regarding medical conditions and treatment. The note was dictated using speech recognition software. Accuracy and grammar in transcription may be subject to error.

## 2020-02-28 ENCOUNTER — TELEPHONE (OUTPATIENT)
Dept: PAIN CLINIC | Facility: CLINIC | Age: 85
End: 2020-02-28

## 2020-02-28 ENCOUNTER — TELEPHONE (OUTPATIENT)
Dept: CASE MANAGEMENT | Facility: HOSPITAL | Age: 85
End: 2020-02-28

## 2020-02-28 ENCOUNTER — TELEPHONE (OUTPATIENT)
Dept: SURGERY | Facility: CLINIC | Age: 85
End: 2020-02-28

## 2020-02-28 DIAGNOSIS — S32.2XXA CLOSED FRACTURE OF COCCYX, INITIAL ENCOUNTER (HCC): ICD-10-CM

## 2020-02-28 DIAGNOSIS — S32.10XA CLOSED FRACTURE OF SACRUM, UNSPECIFIED FRACTURE MORPHOLOGY, INITIAL ENCOUNTER (HCC): Primary | ICD-10-CM

## 2020-02-28 NOTE — ANESTHESIA POSTPROCEDURE EVALUATION
[address] [place] [address] Patient Status:  Outpatient   Age/Gender 80year old female MRN [id number]   [facility] MRI Attending Nicole Chong, [address] # 0 PCP Hardin Cooks, MD       Anesthesia Post-op Note    * No procedur

## 2020-02-28 NOTE — TELEPHONE ENCOUNTER
MRI lumbar, sacrum/coccyx, and pelvis discussed with patient's daughter-in-law. Patient scheduled for an office visit on 3/2/20 for further evaluation and plan of care discussion. Consider sacroplasty for sacral fracture. Brace ordered today, she is aware.

## 2020-02-28 NOTE — PROGRESS NOTES
This worker contacted by PCP office asking about pt being able to get into [facility]. Pt was admitted to a hospital in [address] [place] [address] and was discharged and returned back home to family. Family inquiring about [facility].  They have already attempted to obtain [facility] placement

## 2020-02-28 NOTE — TELEPHONE ENCOUNTER
MRI lumbar, sacrum/coccyx, and pelvis discussed with patient's daughter-in-law. Patient scheduled for an office visit on 3/2/20 for further evaluation and plan of care discussion. Consider sacroplasty for sacral fracture. Brace ordered today.      Bryan barcenas

## 2020-03-02 ENCOUNTER — TELEPHONE (OUTPATIENT)
Dept: PAIN CLINIC | Facility: CLINIC | Age: 85
End: 2020-03-02

## 2020-03-02 ENCOUNTER — OFFICE VISIT (OUTPATIENT)
Dept: PAIN CLINIC | Facility: CLINIC | Age: 85
End: 2020-03-02
Payer: MEDICARE

## 2020-03-02 VITALS
OXYGEN SATURATION: 96 % | SYSTOLIC BLOOD PRESSURE: 100 MMHG | DIASTOLIC BLOOD PRESSURE: 60 MMHG | HEART RATE: 99 BPM | WEIGHT: 170 LBS | BODY MASS INDEX: 31.28 KG/M2 | HEIGHT: 62 IN

## 2020-03-02 DIAGNOSIS — F03.90 DEMENTIA WITHOUT BEHAVIORAL DISTURBANCE, UNSPECIFIED DEMENTIA TYPE (HCC): ICD-10-CM

## 2020-03-02 DIAGNOSIS — S32.10XA CLOSED FRACTURE OF SACRUM, UNSPECIFIED PORTION OF SACRUM, INITIAL ENCOUNTER (HCC): Primary | ICD-10-CM

## 2020-03-02 DIAGNOSIS — S32.2XXA CLOSED FRACTURE OF COCCYX, INITIAL ENCOUNTER (HCC): Primary | ICD-10-CM

## 2020-03-02 DIAGNOSIS — R79.89 ELEVATED D-DIMER: ICD-10-CM

## 2020-03-02 PROCEDURE — 99214 OFFICE O/P EST MOD 30 MIN: CPT | Performed by: NURSE PRACTITIONER

## 2020-03-02 NOTE — TELEPHONE ENCOUNTER
I have ordered sacral vertebroplasty, request 3/9/20 noon. Please fax to IR and ask them to call patient to confirm time.  In case brace rep calls Dr. Denney Primrose informed family that she does not need the brace at this time since she is mostly sitting in a wheelc

## 2020-03-02 NOTE — PATIENT INSTRUCTIONS
Vertebroplasty    Fractures in the bones of the spine (vertebrae) can cause severe back pain and loss of movement lasting more than 6 weeks. Vertebroplasty is a procedure in which surgical cement is injected into the fractured vertebrae.  This can make th · You will most likely be able to go home within a few hours. Or you may need to stay in the hospital overnight. · You may feel an ache at the puncture sites for the next 24 to 48 hours. To ease this pain, use ice and pain medicines as directed.   · Drink

## 2020-03-02 NOTE — PROGRESS NOTES
Name: Rojas Bang   : 1932   DOS: 3/2/2020     Pain Clinic Follow Up Visit:   Rojas Bang is a 80year old female who presents for recheck of her chronic left sacral pain.  Patient's family states that she is doing a slight bit better over t MCG/ACT Inhalation Aero Soln Inhale 2 puffs into the lungs every 4 (four) hours as needed for Wheezing. • loratadine (CLARITIN) 10 MG Oral Tab Take 10 mg by mouth daily. • ASPIRIN Take 81 mg by mouth daily.        • EYE-TRINITY PLUS LUTEIN OR 1 TABLET arthropathy or effusion. PELVIC ORGANS:  No visible mass or pelvic adenopathy. Diverticular disease. No free fluid. OTHER:  Pessary noted.       =====  CONCLUSION:    1.  Bone contusion with nondisplaced fracture involves the left sacrum and proximal Sacrococcygeal disorders, not elsewhere classified M54.16 Radiculopathy, lumbar region     TECHNIQUE:  Multiplanar T1 and T2 weighted images including fat suppression sequences. Images acquired in sagittal and axial planes.        PATIENT STATED HISTORY: ( moderate to marked osteoarthritic changes are noted. There is no high-grade spinal canal stenosis. Multiple levels with severe neural foraminal stenosis along with foraminal extension of disc bulges is noted.         Dictated by: Valeria Agee MD contact our office if this medication is not sufficient to help manage pain. Radiology orders and consultations:None    The patient indicates understanding of these issues and agrees to the plan. Return in about 3 weeks (around 3/23/2020).     Nargis Thibodeaux

## 2020-03-02 NOTE — PROGRESS NOTES
Patient presents in office today with reported pain in left hip    Current pain level reported =  Pt cant say /10

## 2020-03-03 ENCOUNTER — APPOINTMENT (OUTPATIENT)
Dept: MRI IMAGING | Facility: HOSPITAL | Age: 85
End: 2020-03-03
Attending: NURSE PRACTITIONER
Payer: MEDICARE

## 2020-03-03 ENCOUNTER — HOSPITAL ENCOUNTER (OUTPATIENT)
Dept: MRI IMAGING | Facility: HOSPITAL | Age: 85
Discharge: HOME | End: 2020-03-03
Attending: NURSE PRACTITIONER
Payer: MEDICARE

## 2020-03-03 ENCOUNTER — TELEPHONE (OUTPATIENT)
Dept: SURGERY | Facility: CLINIC | Age: 85
End: 2020-03-03

## 2020-03-03 RX ORDER — FLUTICASONE PROPIONATE 50 MCG
2 SPRAY, SUSPENSION (ML) NASAL DAILY
COMMUNITY
End: 2021-05-26 | Stop reason: ALTCHOICE

## 2020-03-03 NOTE — TELEPHONE ENCOUNTER
Continuity of Care notes, & CT of chest report received from [facility], endorsed to RNs for review

## 2020-03-03 NOTE — TELEPHONE ENCOUNTER
Frankie Velazco from IR called to advise that 3/16/2020 is the soonest available date for vertebroplasty. Spoke to Ludin Rodriguez, pt's daughter, to advise of soonest available date for vertebroplasty.  Ludin Rodriguez verbalized understanding and is asking that Frankie Velazco contact her

## 2020-03-03 NOTE — TELEPHONE ENCOUNTER
Vertebroplasty orders faxed to IR, Att:Eneida VALLE. Confirmation received.      Patient has Medicare Part B as primary Insurance     No Medical clearance required

## 2020-03-05 ENCOUNTER — MED REC SCAN ONLY (OUTPATIENT)
Dept: FAMILY MEDICINE CLINIC | Facility: CLINIC | Age: 85
End: 2020-03-05

## 2020-03-18 ENCOUNTER — TELEPHONE (OUTPATIENT)
Dept: PAIN CLINIC | Facility: CLINIC | Age: 85
End: 2020-03-18

## 2020-04-01 ENCOUNTER — PATIENT MESSAGE (OUTPATIENT)
Dept: FAMILY MEDICINE CLINIC | Facility: CLINIC | Age: 85
End: 2020-04-01

## 2020-04-01 DIAGNOSIS — K59.00 CONSTIPATION, UNSPECIFIED CONSTIPATION TYPE: Primary | ICD-10-CM

## 2020-04-02 NOTE — TELEPHONE ENCOUNTER
Will order abdominal xray. Please notify  and take to ER if abdominal pain develops in the meantime.

## 2020-04-02 NOTE — TELEPHONE ENCOUNTER
Olamide Sagastume called back stating pt did not have any BM today and he did a rectal exam on pt , states she is blocked with stool, feels hard stool on exam. Wants to know if you want pt to do xrays for this.

## 2020-04-02 NOTE — TELEPHONE ENCOUNTER
Called to  Sylvia Linton. Advised him of Dr Christina Sims recommendations and to contact our office if he or pt needs anything. Number to [employer] given to Sylvia Linton. Sylvia Linton voiced understanding and agreed to plan.

## 2020-04-02 NOTE — TELEPHONE ENCOUNTER
Recommend OTC miralax + colace for a few days and if no BM, try magnesium citrate + colace. Continue high fiber diet. Since she is not having any abdominal pain, would hold off on imaging.   If she begins having nausea/vomiting/abdominal pain, this can be

## 2020-04-03 ENCOUNTER — HOSPITAL ENCOUNTER (OUTPATIENT)
Dept: GENERAL RADIOLOGY | Facility: HOSPITAL | Age: 85
Discharge: HOME | End: 2020-04-03
Attending: FAMILY MEDICINE
Payer: MEDICARE

## 2020-04-03 DIAGNOSIS — K59.00 CONSTIPATION, UNSPECIFIED CONSTIPATION TYPE: ICD-10-CM

## 2020-04-03 PROCEDURE — 74019 RADEX ABDOMEN 2 VIEWS: CPT | Performed by: FAMILY MEDICINE

## 2020-04-03 RX ORDER — MONTELUKAST SODIUM 10 MG/1
TABLET ORAL
Qty: 90 TABLET | Refills: 0 | Status: SHIPPED | OUTPATIENT
Start: 2020-04-03 | End: 2020-07-05

## 2020-04-03 NOTE — TELEPHONE ENCOUNTER
Pt had x-ray of abdomen done today but was told it may not be read for 3-5 days because it is not urgent.  calling to see if our office can have them read it today. Mend[place] [phone].

## 2020-04-27 ENCOUNTER — TELEPHONE (OUTPATIENT)
Dept: FAMILY MEDICINE CLINIC | Facility: CLINIC | Age: 85
End: 2020-04-27

## 2020-04-27 DIAGNOSIS — R32 URINARY INCONTINENCE, UNSPECIFIED TYPE: Primary | ICD-10-CM

## 2020-04-27 NOTE — TELEPHONE ENCOUNTER
Urine cultures ordered patient's  can  take the urine to [facility]  lab.

## 2020-04-27 NOTE — TELEPHONE ENCOUNTER
Pt's , Ashlee, requested Dr. Lucian Salinas. order a urinalysis for pt. He suspects pt might have a urine infection. Pt's  said pt has had condition for at least 10 days.      Pt's  said pt has alzheimer's and is hard for her to articulate how s

## 2020-04-27 NOTE — TELEPHONE ENCOUNTER
Neda Verduzco aware to do urine cult on pt, Neda eVrduzco will come to our office and  supplies for UA cult.

## 2020-04-29 ENCOUNTER — APPOINTMENT (OUTPATIENT)
Dept: LAB | Age: 85
End: 2020-04-29
Attending: FAMILY MEDICINE
Payer: MEDICARE

## 2020-04-29 DIAGNOSIS — R32 URINARY INCONTINENCE, UNSPECIFIED TYPE: ICD-10-CM

## 2020-04-29 PROCEDURE — 87186 SC STD MICRODIL/AGAR DIL: CPT

## 2020-04-29 PROCEDURE — 87077 CULTURE AEROBIC IDENTIFY: CPT

## 2020-04-29 PROCEDURE — 87086 URINE CULTURE/COLONY COUNT: CPT

## 2020-05-01 RX ORDER — CIPROFLOXACIN 250 MG/1
250 TABLET, FILM COATED ORAL 2 TIMES DAILY
Qty: 14 TABLET | Refills: 0 | Status: SHIPPED | OUTPATIENT
Start: 2020-05-01 | End: 2020-05-08

## 2020-05-05 ENCOUNTER — APPOINTMENT (OUTPATIENT)
Dept: GENERAL RADIOLOGY | Facility: HOSPITAL | Age: 85
End: 2020-05-05
Attending: EMERGENCY MEDICINE
Payer: MEDICARE

## 2020-05-05 ENCOUNTER — HOSPITAL ENCOUNTER (EMERGENCY)
Facility: HOSPITAL | Age: 85
Discharge: HOME | End: 2020-05-05
Attending: EMERGENCY MEDICINE
Payer: MEDICARE

## 2020-05-05 ENCOUNTER — TELEPHONE (OUTPATIENT)
Dept: FAMILY MEDICINE CLINIC | Facility: CLINIC | Age: 85
End: 2020-05-05

## 2020-05-05 VITALS
RESPIRATION RATE: 14 BRPM | DIASTOLIC BLOOD PRESSURE: 70 MMHG | OXYGEN SATURATION: 96 % | HEIGHT: 62 IN | BODY MASS INDEX: 31.28 KG/M2 | TEMPERATURE: 98 F | HEART RATE: 68 BPM | SYSTOLIC BLOOD PRESSURE: 102 MMHG | WEIGHT: 170 LBS

## 2020-05-05 DIAGNOSIS — T50.905A MEDICATION REACTION, INITIAL ENCOUNTER: ICD-10-CM

## 2020-05-05 DIAGNOSIS — R40.0 SOMNOLENCE: Primary | ICD-10-CM

## 2020-05-05 PROCEDURE — 99284 EMERGENCY DEPT VISIT MOD MDM: CPT

## 2020-05-05 PROCEDURE — 81001 URINALYSIS AUTO W/SCOPE: CPT | Performed by: EMERGENCY MEDICINE

## 2020-05-05 PROCEDURE — 71045 X-RAY EXAM CHEST 1 VIEW: CPT | Performed by: EMERGENCY MEDICINE

## 2020-05-05 PROCEDURE — 83880 ASSAY OF NATRIURETIC PEPTIDE: CPT | Performed by: EMERGENCY MEDICINE

## 2020-05-05 PROCEDURE — 36415 COLL VENOUS BLD VENIPUNCTURE: CPT

## 2020-05-05 PROCEDURE — 87040 BLOOD CULTURE FOR BACTERIA: CPT | Performed by: EMERGENCY MEDICINE

## 2020-05-05 PROCEDURE — 83605 ASSAY OF LACTIC ACID: CPT | Performed by: EMERGENCY MEDICINE

## 2020-05-05 PROCEDURE — 51701 INSERT BLADDER CATHETER: CPT

## 2020-05-05 PROCEDURE — 80053 COMPREHEN METABOLIC PANEL: CPT | Performed by: EMERGENCY MEDICINE

## 2020-05-05 PROCEDURE — 99285 EMERGENCY DEPT VISIT HI MDM: CPT

## 2020-05-05 PROCEDURE — 93005 ELECTROCARDIOGRAM TRACING: CPT

## 2020-05-05 PROCEDURE — 93010 ELECTROCARDIOGRAM REPORT: CPT

## 2020-05-05 PROCEDURE — 85025 COMPLETE CBC W/AUTO DIFF WBC: CPT | Performed by: EMERGENCY MEDICINE

## 2020-05-05 NOTE — ED PROVIDER NOTES
Patient Seen in: [facility] Emergency Department      History   Patient presents with:  Altered Mental Status    Stated Complaint: ams    HPI    41-year-old female presents to the emerge department for further evaluation of having a low O2 saturatio Tobacco Use      Smoking status: Former Smoker        Packs/day: 0.50        Years: 50.00        Pack years: 25        Types: Cigarettes, Cigars      Smokeless tobacco: Never Used      Tobacco comment: 30-40 years ago    Alcohol use: No      Alcohol/week: No tenderness. Right lower leg: Edema present. Left lower leg: Edema present. Lymphadenopathy:      Cervical: No cervical adenopathy. Skin:     General: Skin is warm and dry. Capillary Refill: Capillary refill takes less than 2 seconds. Report. Rate: 66  Rhythm: Sinus Rhythm  Reading: No acute ST segment changes normal EKG              Xr Chest Ap Portable  (cpt=71045)    Result Date: 5/5/2020  PROCEDURE:  XR CHEST AP PORTABLE  (CPT=71045)  TECHNIQUE:  AP chest radiograph was obtained. 3:34 pm    Follow-up:  MD Maryanne Portillo 1499 [phone]    Call  If symptoms worsen          Medications Prescribed:  Current Discharge Medication List

## 2020-05-05 NOTE — TELEPHONE ENCOUNTER
ARCHANA:  Pt.s spouse called. He called 911 today for his wife because she was very weak and very lethargic. He had a difficult time trying to wake her. He wanted to let Dr. Bakari Garg know she was at [facility] in the ER. There son is with her.  I reassured sp

## 2020-05-05 NOTE — TELEPHONE ENCOUNTER
Pt's  said that pt is in the ED because she couldn't get up and respond this morning. Pt's  called paramedics who took her to the ED. Pt's  said pt has been at hospital for 2 hours.     Pt's  is concerned about   Ciprofloxacin HC

## 2020-05-05 NOTE — ED INITIAL ASSESSMENT (HPI)
Arrives via [facility] from home, hx of dementia.  checked sat and was in the [de-identified]. Upon arrival with EMS, sats were in the 90s. Alert.

## 2020-05-05 NOTE — ED NOTES
In and out cath performed. Approximately 10 ml of clear yellow urine obtained. Growled and hissed at staff during procedure.

## 2020-05-22 ENCOUNTER — PATIENT MESSAGE (OUTPATIENT)
Dept: FAMILY MEDICINE CLINIC | Facility: CLINIC | Age: 85
End: 2020-05-22

## 2020-05-23 ENCOUNTER — TELEPHONE (OUTPATIENT)
Dept: FAMILY MEDICINE CLINIC | Facility: CLINIC | Age: 85
End: 2020-05-23

## 2020-05-23 DIAGNOSIS — R60.0 EDEMA OF BOTH LEGS: ICD-10-CM

## 2020-05-23 DIAGNOSIS — S81.802A OPEN WOUND OF LEFT LOWER LEG, INITIAL ENCOUNTER: Primary | ICD-10-CM

## 2020-05-23 PROCEDURE — 99442 PHONE E/M BY PHYS 11-20 MIN: CPT | Performed by: FAMILY MEDICINE

## 2020-05-23 NOTE — TELEPHONE ENCOUNTER
Virtual/Telephone Check-In    Nicola Cantu verbally consents to a Virtual/Telephone Check-In service on 05/23/20. Patient understands and accepts financial responsibility for any deductible, co-insurance and/or co-pays associated with this service.  This After questioning  says that he does have a topical antibiotic.   Recommended to use the topical triple antibiotic in a copious amount or use triple antibiotic and Vaseline on the top covered with gauze dressing or nonadhesive dressing and use gauze • MEMANTINE HCL 10 MG Oral Tab TAKE 1 TABLET BY MOUTH TWICE DAILY (Patient taking differently: 2 (two) times daily.   ) 180 tablet 1   • FLOVENT  MCG/ACT Inhalation Aerosol INHALE 1 PUFF BY MOUTH TWICE DAILY (Patient taking differently: daily.  ) 2 I Acute leg pain, right     Lumbar radiculitis     History of left breast cancer      Physical Exam:  GEN: A&Ox3, NAD  Patient speaking in full sentences  No increased work of breathing  Wound inspected as below and discussed with patient's  over t

## 2020-05-23 NOTE — TELEPHONE ENCOUNTER
From: Sabrina Harper  To: Titi Kruse MD  Sent: 5/22/2020 8:53 PM CDT  Subject: Non-Urgent Medical Question    My mother has developed a sore on the lower shin of her leg. We are not aware of any injury. It had some liquid in it, like a blister.  I

## 2020-06-11 NOTE — TELEPHONE ENCOUNTER
Incoming call from [facility]), requests Discharge from Home care services, patient has no signs/symptoms of flu, no signs/symptoms COPD exacerbation, no signs/symptoms weakness. Please advise, thank you.   Katherine contact n
Message given to Dagmar Miller , voices understanding.
They will be okay.   Thank you
Detail Level: Generalized

## 2020-07-03 RX ORDER — SIMVASTATIN 20 MG
TABLET ORAL
Qty: 90 TABLET | Refills: 0 | Status: SHIPPED | OUTPATIENT
Start: 2020-07-03 | End: 2020-10-30

## 2020-07-05 RX ORDER — MONTELUKAST SODIUM 10 MG/1
TABLET ORAL
Qty: 90 TABLET | Refills: 0 | Status: SHIPPED | OUTPATIENT
Start: 2020-07-05 | End: 2020-10-21

## 2020-08-24 DIAGNOSIS — J30.89 NON-SEASONAL ALLERGIC RHINITIS DUE TO OTHER ALLERGIC TRIGGER: ICD-10-CM

## 2020-08-27 RX ORDER — FLUTICASONE PROPIONATE 220 UG/1
AEROSOL, METERED RESPIRATORY (INHALATION)
Qty: 12 G | Refills: 0 | Status: SHIPPED | OUTPATIENT
Start: 2020-08-27 | End: 2020-12-16

## 2020-10-16 DIAGNOSIS — G30.1 LATE ONSET ALZHEIMER'S DISEASE WITHOUT BEHAVIORAL DISTURBANCE (HCC): ICD-10-CM

## 2020-10-16 DIAGNOSIS — F02.80 LATE ONSET ALZHEIMER'S DISEASE WITHOUT BEHAVIORAL DISTURBANCE (HCC): ICD-10-CM

## 2020-10-21 RX ORDER — DONEPEZIL HYDROCHLORIDE 23 MG/1
TABLET, FILM COATED ORAL
Qty: 90 TABLET | Refills: 0 | Status: SHIPPED | OUTPATIENT
Start: 2020-10-21 | End: 2021-01-15

## 2020-10-21 RX ORDER — MONTELUKAST SODIUM 10 MG/1
TABLET ORAL
Qty: 90 TABLET | Refills: 0 | Status: SHIPPED | OUTPATIENT
Start: 2020-10-21 | End: 2021-01-15

## 2020-10-21 NOTE — TELEPHONE ENCOUNTER
Montelukast Sodium 10 MG Oral Tablet    Donepezil HCl 23 MG Oral Tablet    The pt has an upcoming appt scheduled for 12/10/2020. Please see pended medications. Please sign if appropriate.       Thank you

## 2020-10-30 RX ORDER — SIMVASTATIN 20 MG
TABLET ORAL
Qty: 90 TABLET | Refills: 0 | Status: SHIPPED | OUTPATIENT
Start: 2020-10-30 | End: 2021-02-19

## 2020-11-27 ENCOUNTER — TELEPHONE (OUTPATIENT)
Dept: FAMILY MEDICINE CLINIC | Facility: CLINIC | Age: 85
End: 2020-11-27

## 2020-11-27 ENCOUNTER — APPOINTMENT (OUTPATIENT)
Dept: GENERAL RADIOLOGY | Facility: HOSPITAL | Age: 85
End: 2020-11-27
Attending: EMERGENCY MEDICINE
Payer: MEDICARE

## 2020-11-27 ENCOUNTER — HOSPITAL ENCOUNTER (EMERGENCY)
Facility: HOSPITAL | Age: 85
Discharge: HOME | End: 2020-11-27
Attending: EMERGENCY MEDICINE
Payer: MEDICARE

## 2020-11-27 VITALS
SYSTOLIC BLOOD PRESSURE: 126 MMHG | OXYGEN SATURATION: 97 % | DIASTOLIC BLOOD PRESSURE: 75 MMHG | TEMPERATURE: 100 F | RESPIRATION RATE: 17 BRPM | HEART RATE: 104 BPM

## 2020-11-27 DIAGNOSIS — R53.1 GENERALIZED WEAKNESS: ICD-10-CM

## 2020-11-27 DIAGNOSIS — N30.00 ACUTE CYSTITIS WITHOUT HEMATURIA: Primary | ICD-10-CM

## 2020-11-27 PROCEDURE — 81001 URINALYSIS AUTO W/SCOPE: CPT | Performed by: EMERGENCY MEDICINE

## 2020-11-27 PROCEDURE — 99284 EMERGENCY DEPT VISIT MOD MDM: CPT

## 2020-11-27 PROCEDURE — 96365 THER/PROPH/DIAG IV INF INIT: CPT

## 2020-11-27 PROCEDURE — 84484 ASSAY OF TROPONIN QUANT: CPT | Performed by: EMERGENCY MEDICINE

## 2020-11-27 PROCEDURE — 87086 URINE CULTURE/COLONY COUNT: CPT | Performed by: EMERGENCY MEDICINE

## 2020-11-27 PROCEDURE — 71045 X-RAY EXAM CHEST 1 VIEW: CPT | Performed by: EMERGENCY MEDICINE

## 2020-11-27 PROCEDURE — 85379 FIBRIN DEGRADATION QUANT: CPT | Performed by: EMERGENCY MEDICINE

## 2020-11-27 PROCEDURE — 85025 COMPLETE CBC W/AUTO DIFF WBC: CPT | Performed by: EMERGENCY MEDICINE

## 2020-11-27 PROCEDURE — 80053 COMPREHEN METABOLIC PANEL: CPT | Performed by: EMERGENCY MEDICINE

## 2020-11-27 PROCEDURE — 99285 EMERGENCY DEPT VISIT HI MDM: CPT

## 2020-11-27 RX ORDER — LEVOFLOXACIN 5 MG/ML
750 INJECTION, SOLUTION INTRAVENOUS ONCE
Status: COMPLETED | OUTPATIENT
Start: 2020-11-27 | End: 2020-11-27

## 2020-11-27 RX ORDER — CIPROFLOXACIN 250 MG/1
250 TABLET, FILM COATED ORAL 2 TIMES DAILY
Qty: 14 TABLET | Refills: 0 | Status: SHIPPED | OUTPATIENT
Start: 2020-11-27 | End: 2020-11-30

## 2020-11-27 NOTE — ED PROVIDER NOTES
Patient Seen in: [facility] Emergency Department      History   Patient presents with:  Fatigue  Urinary Symptoms    Stated Complaint: weakness, possible UTI    HPI    51-year-old female presents emergency department here for fatigue and possible ur Systems    Positive for stated complaint: weakness, possible UTI  Other systems are as noted in HPI. Constitutional and vital signs reviewed. All other systems reviewed and negative except as noted above.     Physical Exam     ED Triage Vitals [11/27/ Esterase Urine Large (*)     WBC Urine 5-10 (*)     RBC URINE 3-5 (*)     Bacteria Urine 2+ (*)     Squamous Epi.  Cells Large (*)     Mucous Urine 3+ (*)     All other components within normal limits   COMP METABOLIC PANEL (14) - Abnormal; Notable for the Covid here. I did agree with plan but states she definitely needs to push fluids and take oral antibiotics. MDM      Patient was made aware of medical condition and instructed to take medications as prescribed.   Patient is aware that they are to return

## 2020-11-27 NOTE — TELEPHONE ENCOUNTER
Received page from pt's daughter. Reports pt has been weak and shaky the past few days. Has hx of UTI's. Did a home UTI kit showing large amount of leukocytes and negative nitrates. Requesting antibiotic to be sent to pharmacy.  Advised pt's daughter given

## 2020-11-28 NOTE — ED NOTES
Critical Care Received patient from [facility]. Patient here for possible UTI. Antibiotics running and patient to be discharged after they are finished. Patient is alert on cart with daughter at bedside. Patient eating a sandwich.

## 2020-11-28 NOTE — ED NOTES
Patient discharged to home with daughter. Follow-up with PCP and antibiotics discussed. Patient AOx1 with no signs of acute distress.

## 2020-11-30 ENCOUNTER — TELEPHONE (OUTPATIENT)
Dept: FAMILY MEDICINE CLINIC | Facility: CLINIC | Age: 85
End: 2020-11-30

## 2020-11-30 NOTE — TELEPHONE ENCOUNTER
Live call to triage. Sanjay/spouse reports pt seen in ER 11/27/2020-diagnosed w uti and started cipro 11/28/2020-250 mg twice a day for 7 days.    sts today pt reporting nausea-feels it may be due to the abx. sts \"dr gusman have treated her utis befor

## 2020-11-30 NOTE — TELEPHONE ENCOUNTER
said patient was prescribed antibiotic last week. She started feeling nauseous this morning.  wants to know if this is a side effect from the antibiotic? She is sleepy and dizzy. Live call sent to triage.

## 2020-11-30 NOTE — TELEPHONE ENCOUNTER
Call back to hattie/spouse-advised of dr gusman's comments/recommendations. Advised to call back if any new/worsening or persistent symptoms or other questions/concerns. He voices understanding/agrees with plan/no further questions.    Medication r

## 2020-11-30 NOTE — TELEPHONE ENCOUNTER
Urine culture from the ER actually shows contamination there was no UTI she can stop antibiotic. Improve hydration. ,

## 2020-12-01 ENCOUNTER — TELEPHONE (OUTPATIENT)
Dept: FAMILY MEDICINE CLINIC | Facility: CLINIC | Age: 85
End: 2020-12-01

## 2020-12-01 DIAGNOSIS — R31.9 HEMATURIA, UNSPECIFIED TYPE: Primary | ICD-10-CM

## 2020-12-01 NOTE — TELEPHONE ENCOUNTER
MyChart message from patient daughter received. Urine culture negative for infection but there were red cells in the urine. Recheck urinalysis microscopic reflex culture in 1 month for hematuria. Order placed.

## 2020-12-10 ENCOUNTER — OFFICE VISIT (OUTPATIENT)
Dept: FAMILY MEDICINE CLINIC | Facility: CLINIC | Age: 85
End: 2020-12-10
Payer: MEDICARE

## 2020-12-10 VITALS
DIASTOLIC BLOOD PRESSURE: 64 MMHG | SYSTOLIC BLOOD PRESSURE: 104 MMHG | OXYGEN SATURATION: 96 % | HEIGHT: 62 IN | RESPIRATION RATE: 18 BRPM | TEMPERATURE: 98 F | BODY MASS INDEX: 31.1 KG/M2 | HEART RATE: 72 BPM | WEIGHT: 169 LBS

## 2020-12-10 DIAGNOSIS — F02.81 LATE ONSET ALZHEIMER'S DISEASE WITH BEHAVIORAL DISTURBANCE (HCC): ICD-10-CM

## 2020-12-10 DIAGNOSIS — M81.0 AGE-RELATED OSTEOPOROSIS WITHOUT CURRENT PATHOLOGICAL FRACTURE: ICD-10-CM

## 2020-12-10 DIAGNOSIS — E55.9 VITAMIN D DEFICIENCY: ICD-10-CM

## 2020-12-10 DIAGNOSIS — E78.00 PURE HYPERCHOLESTEROLEMIA: ICD-10-CM

## 2020-12-10 DIAGNOSIS — G30.1 LATE ONSET ALZHEIMER'S DISEASE WITH BEHAVIORAL DISTURBANCE (HCC): ICD-10-CM

## 2020-12-10 DIAGNOSIS — R32 URINARY INCONTINENCE, UNSPECIFIED TYPE: ICD-10-CM

## 2020-12-10 DIAGNOSIS — J30.89 NON-SEASONAL ALLERGIC RHINITIS DUE TO OTHER ALLERGIC TRIGGER: ICD-10-CM

## 2020-12-10 DIAGNOSIS — Z85.3 HISTORY OF LEFT BREAST CANCER: ICD-10-CM

## 2020-12-10 DIAGNOSIS — Z00.00 ENCOUNTER FOR ANNUAL HEALTH EXAMINATION: Primary | ICD-10-CM

## 2020-12-10 DIAGNOSIS — F02.80 LATE ONSET ALZHEIMER'S DISEASE WITHOUT BEHAVIORAL DISTURBANCE (HCC): ICD-10-CM

## 2020-12-10 DIAGNOSIS — G30.1 LATE ONSET ALZHEIMER'S DISEASE WITHOUT BEHAVIORAL DISTURBANCE (HCC): ICD-10-CM

## 2020-12-10 DIAGNOSIS — R53.1 GENERALIZED WEAKNESS: ICD-10-CM

## 2020-12-10 DIAGNOSIS — J45.30 MILD PERSISTENT ASTHMA WITHOUT COMPLICATION: ICD-10-CM

## 2020-12-10 DIAGNOSIS — R60.0 EDEMA OF BOTH LEGS: ICD-10-CM

## 2020-12-10 PROBLEM — F02.818 LATE ONSET ALZHEIMER'S DISEASE WITH BEHAVIORAL DISTURBANCE (HCC): Status: ACTIVE | Noted: 2020-12-10

## 2020-12-10 PROCEDURE — 99214 OFFICE O/P EST MOD 30 MIN: CPT | Performed by: FAMILY MEDICINE

## 2020-12-10 PROCEDURE — G0444 DEPRESSION SCREEN ANNUAL: HCPCS | Performed by: FAMILY MEDICINE

## 2020-12-10 PROCEDURE — G0439 PPPS, SUBSEQ VISIT: HCPCS | Performed by: FAMILY MEDICINE

## 2020-12-10 NOTE — PATIENT INSTRUCTIONS
Continue current meds. Watch diet for fats and carbs. Stay active. Call [phone] to schedule fasting blood work and check urine test at that time. Follow-up in 6 months for med visit.   Tiarra Macias's SCREENING SCHEDULE   Tests on this list ar Electrocardiogram date05/05/2020 Routine EKG is not a screening covered service except at the Welcome to Medicare Visit    Abdominal aortic aneurysm screening (once between ages 73-68) IPPE only No results found for this or any previous visit.  Limited to p patient. Chlamydia  Annually if high risk No results found for: CHLAMYDIA No flowsheet data found.     Screening Mammogram      Mammogram    Recommend Annually to at least age 76, and as needed after 76 There are no preventive care reminders to display Directives    [employer]no. org/publications/Documents/personal_dec. pdf  An information packet, including necessary form from the [employer] website. http://www. [place]ph.state. [place]/public/books/advin.htm  A link to the [place]

## 2020-12-10 NOTE — PROGRESS NOTES
HPI:   Donny Marcus is a 80year old female who presents for a Medicare Subsequent Annual Wellness visit (Pt already had Initial Annual Wellness) still complaining of having   asthma, dementia, hyperlipidemia, balance problem/generalized weakness eder Functional Ability/Status   Mikki Jacob has some abnormal functions as listed below:  She has Dressing and/or Bathing issues based on screening of functional status.    Difficulty dressing or bathing?: Y  Bathing or Showering: Cannot do without help does have a POA but we do NOT have it on file in [address] [place] [address] Rd. The patient has this document but we do not have it in [facility], and patient is instructed to get our office a copy of it for scanning into Epic.            She smoked tobacco in the past but quit greater ambulate     Generalized weakness     Pneumonia of right lower lobe due to infectious organism     Acute leg pain, right     Lumbar radiculitis     History of left breast cancer     Late onset Alzheimer's disease with behavioral disturbance ([facility]    Vanessa Del Valle CITRACAL + D OR, BID       MEDICAL INFORMATION:   She  has a past medical history of Asthma, Breast CA ([facility] COPD (chronic obstructive pulmonary disease) ([facility] Dementia ([facility] Extrinsic asthma, unspecified, High cholesterol, History of bone density study 96%   Breastfeeding No   BMI 30.91 kg/m²  Estimated body mass index is 30.91 kg/m² as calculated from the following:    Height as of this encounter: 5' 2\" (1.575 m). Weight as of this encounter: 169 lb (76.7 kg).     Medicare Hearing Assessment  (Requir 09/09/2013, 10/19/2014, 10/16/2015   • Pneumococcal (Prevnar 13) 08/26/2015   • Pneumovax 23 11/01/2007   • TDAP 09/12/2016   • Zoster Vaccine Live (Zostavax) 09/01/2012      Results for orders placed or performed during the hospital encounter of 11/27/20 Ref Range    D-Dimer 1.97 (H) <0.88 ug/mL FEU   RAINBOW DRAW BLUE   Result Value Ref Range    Hold Blue Auto Resulted    RAINBOW DRAW LAVENDER   Result Value Ref Range    Hold Lavender Auto Resulted    RAINBOW DRAW LIGHT GREEN   Result Value Ref Range    H due to other allergic trigger    Vitamin D deficiency  -     VITAMIN D, 25-HYDROXY; Future    Mild persistent asthma without complication    Pure hypercholesterolemia  -     LIPID PANEL;  Future    Age-related osteoporosis without current pathological fract lost more than 10 pounds without trying?: 2 - No  Has your appetite been poor?: No  How does the patient maintain a good energy level?: (The patient has Dementia, she can't do much)  How would you describe your daily physical activity?: None  How would you preventive care reminders to display for this patient. Update Health Maintenance if applicable    Chlamydia  Annually if high risk No results found for: CHLAMYDIA No flowsheet data found.     Screening Mammogram      Mammogram Annually to 76, then as discus

## 2020-12-15 DIAGNOSIS — J30.89 NON-SEASONAL ALLERGIC RHINITIS DUE TO OTHER ALLERGIC TRIGGER: ICD-10-CM

## 2020-12-16 RX ORDER — FLUTICASONE PROPIONATE 220 UG/1
1 AEROSOL, METERED RESPIRATORY (INHALATION) 2 TIMES DAILY
Qty: 12 G | Refills: 2 | Status: SHIPPED | OUTPATIENT
Start: 2020-12-16 | End: 2022-01-17

## 2020-12-16 NOTE — TELEPHONE ENCOUNTER
: Flovent  MCG/ACT Inhalation Aerosol    Medication failed due to no recent AAP or ACT done. Please see pended medications. Please sign if appropriate.       Thank you      Last OV: 12/10/2020

## 2020-12-17 ENCOUNTER — LAB ENCOUNTER (OUTPATIENT)
Dept: LAB | Age: 85
End: 2020-12-17
Attending: FAMILY MEDICINE
Payer: MEDICARE

## 2020-12-17 DIAGNOSIS — G30.1 LATE ONSET ALZHEIMER'S DISEASE WITHOUT BEHAVIORAL DISTURBANCE (HCC): ICD-10-CM

## 2020-12-17 DIAGNOSIS — E78.00 PURE HYPERCHOLESTEROLEMIA: ICD-10-CM

## 2020-12-17 DIAGNOSIS — R31.9 HEMATURIA, UNSPECIFIED TYPE: ICD-10-CM

## 2020-12-17 DIAGNOSIS — E55.9 VITAMIN D DEFICIENCY: ICD-10-CM

## 2020-12-17 DIAGNOSIS — R53.1 GENERALIZED WEAKNESS: ICD-10-CM

## 2020-12-17 DIAGNOSIS — F02.80 LATE ONSET ALZHEIMER'S DISEASE WITHOUT BEHAVIORAL DISTURBANCE (HCC): ICD-10-CM

## 2020-12-17 PROCEDURE — 80061 LIPID PANEL: CPT

## 2020-12-17 PROCEDURE — 84443 ASSAY THYROID STIM HORMONE: CPT

## 2020-12-17 PROCEDURE — 81001 URINALYSIS AUTO W/SCOPE: CPT

## 2020-12-17 PROCEDURE — 82306 VITAMIN D 25 HYDROXY: CPT

## 2020-12-17 PROCEDURE — 36415 COLL VENOUS BLD VENIPUNCTURE: CPT

## 2020-12-17 PROCEDURE — 87086 URINE CULTURE/COLONY COUNT: CPT

## 2020-12-17 PROCEDURE — 82607 VITAMIN B-12: CPT

## 2021-01-15 DIAGNOSIS — F02.80 LATE ONSET ALZHEIMER'S DISEASE WITHOUT BEHAVIORAL DISTURBANCE (HCC): ICD-10-CM

## 2021-01-15 DIAGNOSIS — G30.1 LATE ONSET ALZHEIMER'S DISEASE WITHOUT BEHAVIORAL DISTURBANCE (HCC): ICD-10-CM

## 2021-01-15 RX ORDER — MONTELUKAST SODIUM 10 MG/1
TABLET ORAL
Qty: 90 TABLET | Refills: 0 | Status: SHIPPED | OUTPATIENT
Start: 2021-01-15 | End: 2021-04-09

## 2021-01-15 RX ORDER — DONEPEZIL HYDROCHLORIDE 23 MG/1
TABLET, FILM COATED ORAL
Qty: 90 TABLET | Refills: 0 | Status: SHIPPED | OUTPATIENT
Start: 2021-01-15 | End: 2021-05-24

## 2021-02-19 RX ORDER — SIMVASTATIN 20 MG
TABLET ORAL
Qty: 90 TABLET | Refills: 0 | Status: SHIPPED | OUTPATIENT
Start: 2021-02-19 | End: 2021-05-24

## 2021-04-09 RX ORDER — MONTELUKAST SODIUM 10 MG/1
TABLET ORAL
Qty: 90 TABLET | Refills: 0 | Status: SHIPPED | OUTPATIENT
Start: 2021-04-09 | End: 2021-07-23

## 2021-05-14 DIAGNOSIS — G30.1 LATE ONSET ALZHEIMER'S DISEASE WITHOUT BEHAVIORAL DISTURBANCE (HCC): ICD-10-CM

## 2021-05-14 DIAGNOSIS — F02.80 LATE ONSET ALZHEIMER'S DISEASE WITHOUT BEHAVIORAL DISTURBANCE (HCC): ICD-10-CM

## 2021-05-14 NOTE — TELEPHONE ENCOUNTER
LOV 12/10/2020.  Please call pt to make an appt for a follow up with Dr. Landen Andrade for Alzheimer's etc.

## 2021-05-24 RX ORDER — SIMVASTATIN 20 MG
TABLET ORAL
Qty: 90 TABLET | Refills: 0 | Status: SHIPPED | OUTPATIENT
Start: 2021-05-24 | End: 2021-08-28

## 2021-05-24 RX ORDER — DONEPEZIL HYDROCHLORIDE 23 MG/1
TABLET, FILM COATED ORAL
Qty: 90 TABLET | Refills: 0 | Status: SHIPPED | OUTPATIENT
Start: 2021-05-24

## 2021-05-26 ENCOUNTER — OFFICE VISIT (OUTPATIENT)
Dept: FAMILY MEDICINE CLINIC | Facility: CLINIC | Age: 86
End: 2021-05-26
Payer: MEDICARE

## 2021-05-26 VITALS
HEIGHT: 62 IN | WEIGHT: 169 LBS | RESPIRATION RATE: 16 BRPM | SYSTOLIC BLOOD PRESSURE: 128 MMHG | HEART RATE: 68 BPM | BODY MASS INDEX: 31.1 KG/M2 | TEMPERATURE: 98 F | DIASTOLIC BLOOD PRESSURE: 64 MMHG

## 2021-05-26 DIAGNOSIS — J45.31 MILD PERSISTENT ASTHMA WITH ACUTE EXACERBATION: ICD-10-CM

## 2021-05-26 DIAGNOSIS — E78.00 PURE HYPERCHOLESTEROLEMIA: Primary | ICD-10-CM

## 2021-05-26 DIAGNOSIS — R32 URINARY INCONTINENCE, UNSPECIFIED TYPE: ICD-10-CM

## 2021-05-26 DIAGNOSIS — G30.1 LATE ONSET ALZHEIMER'S DEMENTIA WITHOUT BEHAVIORAL DISTURBANCE (HCC): ICD-10-CM

## 2021-05-26 DIAGNOSIS — F02.80 LATE ONSET ALZHEIMER'S DEMENTIA WITHOUT BEHAVIORAL DISTURBANCE (HCC): ICD-10-CM

## 2021-05-26 DIAGNOSIS — R53.1 GENERALIZED WEAKNESS: ICD-10-CM

## 2021-05-26 DIAGNOSIS — R41.3 MEMORY LOSS: ICD-10-CM

## 2021-05-26 DIAGNOSIS — G47.9 SLEEP DIFFICULTIES: ICD-10-CM

## 2021-05-26 DIAGNOSIS — J30.89 NON-SEASONAL ALLERGIC RHINITIS DUE TO OTHER ALLERGIC TRIGGER: ICD-10-CM

## 2021-05-26 PROCEDURE — 99214 OFFICE O/P EST MOD 30 MIN: CPT | Performed by: FAMILY MEDICINE

## 2021-05-26 RX ORDER — ALPRAZOLAM 0.25 MG/1
0.25 TABLET ORAL NIGHTLY PRN
Qty: 30 TABLET | Refills: 1 | Status: ON HOLD | OUTPATIENT
Start: 2021-05-26 | End: 2021-09-10

## 2021-05-26 NOTE — PATIENT INSTRUCTIONS
Try alprazolam 0.25 mg 1/2 to 1 tablet at bedtime as needed. Continue current meds. Watch diet for fats and carbs. Stay active. Return for  [employer] work.

## 2021-05-26 NOTE — PROGRESS NOTES
Levi Olsen is a 80year old female. cc hyperlipidemia, asthma, allergic rhinitis, urgency incontinence, Alzheimer's dementia, hyperglycemia   HPI:   Hyperlipidemia on medication doing well.  is trying to limit what she eats.   Her appetite is ve acids 1000 MG Oral Cap Daily     • albuterol sulfate (2.5 MG/3ML) 0.083% Inhalation Nebu Soln Take 3 mL (2.5 mg total) by nebulization every 4 (four) hours as needed for Wheezing.  75 ampule 1   • Albuterol Sulfate HFA (VENTOLIN) 108 (90 BASE) MCG/ACT Inhal Pulse 68   Temp 98 °F (36.7 °C)   Resp 16   Ht 5' 2\" (1.575 m)   Wt 169 lb (76.7 kg)   BMI 30.91 kg/m²   GENERAL: well developed, well nourished,in no apparent distress, obese.   SKIN: no rashes,no suspicious lesions  HEENT: atraumatic, normocephalic,ears Collection Time: 12/17/20 10:50 AM   Result Value Ref Range    TSH 1.380 0.358 - 3.740 mIU/mL   VITAMIN B12    Collection Time: 12/17/20 10:50 AM   Result Value Ref Range    Vitamin B12 1,554 (H) 193 - 986 pg/mL   URINE CULTURE, ROUTINE    Collection Time:

## 2021-06-07 ENCOUNTER — LAB ENCOUNTER (OUTPATIENT)
Dept: LAB | Age: 86
End: 2021-06-07
Attending: FAMILY MEDICINE
Payer: MEDICARE

## 2021-06-07 DIAGNOSIS — R41.3 MEMORY LOSS: ICD-10-CM

## 2021-06-07 DIAGNOSIS — E78.00 PURE HYPERCHOLESTEROLEMIA: ICD-10-CM

## 2021-06-07 PROCEDURE — 82607 VITAMIN B-12: CPT

## 2021-06-07 PROCEDURE — 36415 COLL VENOUS BLD VENIPUNCTURE: CPT

## 2021-06-07 PROCEDURE — 80053 COMPREHEN METABOLIC PANEL: CPT

## 2021-06-07 PROCEDURE — 80061 LIPID PANEL: CPT

## 2021-07-23 RX ORDER — MONTELUKAST SODIUM 10 MG/1
TABLET ORAL
Qty: 90 TABLET | Refills: 0 | Status: SHIPPED | OUTPATIENT
Start: 2021-07-23

## 2021-08-23 ENCOUNTER — TELEPHONE (OUTPATIENT)
Dept: FAMILY MEDICINE CLINIC | Facility: CLINIC | Age: 86
End: 2021-08-23

## 2021-08-23 RX ORDER — IPRATROPIUM BROMIDE 42 UG/1
2 SPRAY, METERED NASAL 2 TIMES DAILY
Qty: 1 EACH | Refills: 1 | Status: SHIPPED | OUTPATIENT
Start: 2021-08-23 | End: 2022-01-22

## 2021-08-27 DIAGNOSIS — E78.00 PURE HYPERCHOLESTEROLEMIA: Primary | ICD-10-CM

## 2021-08-28 RX ORDER — SIMVASTATIN 20 MG
TABLET ORAL
Qty: 90 TABLET | Refills: 1 | Status: SHIPPED | OUTPATIENT
Start: 2021-08-28

## 2021-09-03 ENCOUNTER — TELEPHONE (OUTPATIENT)
Dept: FAMILY MEDICINE CLINIC | Facility: CLINIC | Age: 86
End: 2021-09-03

## 2021-09-03 NOTE — TELEPHONE ENCOUNTER
Call to yasmine/daughter-sts she just returned from out of town Tuesday 8/31  sts her dad reported pt was constipated for 3-4 days last wk, so he gave her one stool softener tablet and 2/3 bottle of mag citrate 8/28/21.   Reports pt had 4 formed stools lilian

## 2021-09-03 NOTE — TELEPHONE ENCOUNTER
Call to yasmine/daughter-advised of dr gusman's instructions noted below and explained rationale. yasmine voices understanding/agrees with plan/no further questions.  sts will inform pt

## 2021-09-03 NOTE — TELEPHONE ENCOUNTER
Pt's daughter calling looking for advice. Pt was constipated and she was given magnesium sulfate. Pt then had many bowel movements. Since having the bowel movements daughter states that pt has been \"more out of it then usual and weak\". Please advise.

## 2021-09-03 NOTE — TELEPHONE ENCOUNTER
We should then have the patient go to emergency room for evaluation since there is a change in her status. Might related to constipation but I would like her to be checked before the weekend.   Thank you

## 2021-09-08 ENCOUNTER — APPOINTMENT (OUTPATIENT)
Dept: ULTRASOUND IMAGING | Facility: HOSPITAL | Age: 86
End: 2021-09-08
Attending: EMERGENCY MEDICINE
Payer: MEDICARE

## 2021-09-08 ENCOUNTER — APPOINTMENT (OUTPATIENT)
Dept: GENERAL RADIOLOGY | Facility: HOSPITAL | Age: 86
End: 2021-09-08
Attending: EMERGENCY MEDICINE
Payer: MEDICARE

## 2021-09-08 ENCOUNTER — HOSPITAL ENCOUNTER (OUTPATIENT)
Facility: HOSPITAL | Age: 86
Setting detail: OBSERVATION
Discharge: HOSPICE | End: 2021-09-10
Attending: EMERGENCY MEDICINE | Admitting: INTERNAL MEDICINE
Payer: MEDICARE

## 2021-09-08 ENCOUNTER — APPOINTMENT (OUTPATIENT)
Dept: CT IMAGING | Facility: HOSPITAL | Age: 86
End: 2021-09-08
Attending: EMERGENCY MEDICINE
Payer: MEDICARE

## 2021-09-08 DIAGNOSIS — R26.2 UNABLE TO AMBULATE: Primary | ICD-10-CM

## 2021-09-08 LAB
ALBUMIN SERPL-MCNC: 3.2 G/DL (ref 3.4–5)
ALBUMIN/GLOB SERPL: 0.8 {RATIO} (ref 1–2)
ALP LIVER SERPL-CCNC: 55 U/L
ALT SERPL-CCNC: 20 U/L
ANION GAP SERPL CALC-SCNC: 2 MMOL/L (ref 0–18)
AST SERPL-CCNC: 17 U/L (ref 15–37)
BASOPHILS # BLD AUTO: 0.05 X10(3) UL (ref 0–0.2)
BASOPHILS NFR BLD AUTO: 0.8 %
BILIRUB SERPL-MCNC: 0.6 MG/DL (ref 0.1–2)
BILIRUB UR QL STRIP.AUTO: NEGATIVE
BUN BLD-MCNC: 19 MG/DL (ref 7–18)
CALCIUM BLD-MCNC: 8.9 MG/DL (ref 8.5–10.1)
CHLORIDE SERPL-SCNC: 110 MMOL/L (ref 98–112)
CLARITY UR REFRACT.AUTO: CLEAR
CO2 SERPL-SCNC: 25 MMOL/L (ref 21–32)
COLOR UR AUTO: YELLOW
CREAT BLD-MCNC: 0.85 MG/DL
EOSINOPHIL # BLD AUTO: 0.6 X10(3) UL (ref 0–0.7)
EOSINOPHIL NFR BLD AUTO: 9.4 %
ERYTHROCYTE [DISTWIDTH] IN BLOOD BY AUTOMATED COUNT: 13.9 %
GLOBULIN PLAS-MCNC: 4.1 G/DL (ref 2.8–4.4)
GLUCOSE BLD-MCNC: 83 MG/DL (ref 70–99)
GLUCOSE UR STRIP.AUTO-MCNC: NEGATIVE MG/DL
HCT VFR BLD AUTO: 41.9 %
HGB BLD-MCNC: 13.6 G/DL
IMM GRANULOCYTES # BLD AUTO: 0.01 X10(3) UL (ref 0–1)
IMM GRANULOCYTES NFR BLD: 0.2 %
KETONES UR STRIP.AUTO-MCNC: NEGATIVE MG/DL
LEUKOCYTE ESTERASE UR QL STRIP.AUTO: NEGATIVE
LYMPHOCYTES # BLD AUTO: 2.29 X10(3) UL (ref 1–4)
LYMPHOCYTES NFR BLD AUTO: 36 %
M PROTEIN MFR SERPL ELPH: 7.3 G/DL (ref 6.4–8.2)
MAGNESIUM SERPL-MCNC: 2.4 MG/DL (ref 1.6–2.6)
MCH RBC QN AUTO: 30.8 PG (ref 26–34)
MCHC RBC AUTO-ENTMCNC: 32.5 G/DL (ref 31–37)
MCV RBC AUTO: 94.8 FL
MONOCYTES # BLD AUTO: 0.79 X10(3) UL (ref 0.1–1)
MONOCYTES NFR BLD AUTO: 12.4 %
NEUTROPHILS # BLD AUTO: 2.62 X10 (3) UL (ref 1.5–7.7)
NEUTROPHILS # BLD AUTO: 2.62 X10(3) UL (ref 1.5–7.7)
NEUTROPHILS NFR BLD AUTO: 41.2 %
NITRITE UR QL STRIP.AUTO: NEGATIVE
OSMOLALITY SERPL CALC.SUM OF ELEC: 285 MOSM/KG (ref 275–295)
PH UR STRIP.AUTO: 6 [PH] (ref 5–8)
PLATELET # BLD AUTO: 134 10(3)UL (ref 150–450)
POTASSIUM SERPL-SCNC: 4.8 MMOL/L (ref 3.5–5.1)
PROT UR STRIP.AUTO-MCNC: NEGATIVE MG/DL
RBC # BLD AUTO: 4.42 X10(6)UL
RBC UR QL AUTO: NEGATIVE
SARS-COV-2 RNA RESP QL NAA+PROBE: NOT DETECTED
SODIUM SERPL-SCNC: 137 MMOL/L (ref 136–145)
SP GR UR STRIP.AUTO: 1.02 (ref 1–1.03)
UROBILINOGEN UR STRIP.AUTO-MCNC: <2 MG/DL
WBC # BLD AUTO: 6.4 X10(3) UL (ref 4–11)

## 2021-09-08 PROCEDURE — 71045 X-RAY EXAM CHEST 1 VIEW: CPT | Performed by: EMERGENCY MEDICINE

## 2021-09-08 PROCEDURE — 99220 INITIAL OBSERVATION CARE,LEVL III: CPT | Performed by: HOSPITALIST

## 2021-09-08 PROCEDURE — 70450 CT HEAD/BRAIN W/O DYE: CPT | Performed by: EMERGENCY MEDICINE

## 2021-09-08 PROCEDURE — 74018 RADEX ABDOMEN 1 VIEW: CPT | Performed by: EMERGENCY MEDICINE

## 2021-09-08 PROCEDURE — 93970 EXTREMITY STUDY: CPT | Performed by: EMERGENCY MEDICINE

## 2021-09-08 RX ORDER — LIDOCAINE HYDROCHLORIDE 20 MG/ML
10 JELLY TOPICAL ONCE
Status: COMPLETED | OUTPATIENT
Start: 2021-09-08 | End: 2021-09-08

## 2021-09-08 RX ORDER — LORAZEPAM 2 MG/ML
INJECTION INTRAMUSCULAR EVERY 10 MIN PRN
Status: DISCONTINUED | OUTPATIENT
Start: 2021-09-08 | End: 2021-09-09

## 2021-09-08 NOTE — ED PROVIDER NOTES
Patient Seen in: [facility] Emergency Department      History   Patient presents with:  Altered Mental Status    Stated Complaint: AMS, letheragic     HPI/Subjective:   HPI    Patient with a history of dementia, now brought by family because of weak APPENDECTOMY  1954   • APPENDECTOMY     • BREAST SURGERY PROCEDURE UNLISTED  1991    Mastectomy, left   • CHEMOTHERAPY  1991    left breast   • COLONOSCOPY     • MASTECTOMY LEFT  1991   • REMOVAL OF TONSILS,<11 Y/O      as a child   • TONSILLECTOMY patient's reported baseline except weakness  ED Course     Labs Reviewed   COMP METABOLIC PANEL (14) - Abnormal; Notable for the following components:       Result Value    BUN 19 (*)     Albumin 3.2 (*)     A/G Ratio 0.8 (*)     All other components withi between , family, patient will be admitted here for 23 hours and then further attempts for placement in the morning.                        Disposition and Plan     Clinical Impression:  Unable to ambulate  (primary encounter diagnosis)     Disp

## 2021-09-08 NOTE — ED INITIAL ASSESSMENT (HPI)
Family wanted to have patient brought to the hospital due to new agitation. This is not her norm. Pt was uncooperative with EMS.

## 2021-09-09 LAB
ATRIAL RATE: 74 BPM
P AXIS: 59 DEGREES
P-R INTERVAL: 152 MS
Q-T INTERVAL: 450 MS
QRS DURATION: 80 MS
QTC CALCULATION (BEZET): 499 MS
R AXIS: -27 DEGREES
T AXIS: 30 DEGREES
VENTRICULAR RATE: 74 BPM

## 2021-09-09 PROCEDURE — 99225 SUBSEQUENT OBSERVATION CARE: CPT | Performed by: INTERNAL MEDICINE

## 2021-09-09 RX ORDER — MONTELUKAST SODIUM 10 MG/1
10 TABLET ORAL EVERY EVENING
Status: DISCONTINUED | OUTPATIENT
Start: 2021-09-09 | End: 2021-09-10

## 2021-09-09 RX ORDER — HALOPERIDOL 5 MG/ML
0.5 INJECTION INTRAMUSCULAR EVERY 6 HOURS PRN
Status: DISCONTINUED | OUTPATIENT
Start: 2021-09-09 | End: 2021-09-10

## 2021-09-09 RX ORDER — ENOXAPARIN SODIUM 100 MG/ML
40 INJECTION SUBCUTANEOUS DAILY
Status: DISCONTINUED | OUTPATIENT
Start: 2021-09-09 | End: 2021-09-10

## 2021-09-09 RX ORDER — ATORVASTATIN CALCIUM 10 MG/1
10 TABLET, FILM COATED ORAL NIGHTLY
Status: DISCONTINUED | OUTPATIENT
Start: 2021-09-09 | End: 2021-09-10

## 2021-09-09 RX ORDER — ONDANSETRON 2 MG/ML
4 INJECTION INTRAMUSCULAR; INTRAVENOUS EVERY 6 HOURS PRN
Status: DISCONTINUED | OUTPATIENT
Start: 2021-09-09 | End: 2021-09-10

## 2021-09-09 RX ORDER — QUETIAPINE 25 MG/1
25 TABLET, FILM COATED ORAL NIGHTLY PRN
Status: DISCONTINUED | OUTPATIENT
Start: 2021-09-09 | End: 2021-09-10

## 2021-09-09 RX ORDER — ACETAMINOPHEN 325 MG/1
650 TABLET ORAL EVERY 6 HOURS PRN
Status: DISCONTINUED | OUTPATIENT
Start: 2021-09-09 | End: 2021-09-10

## 2021-09-09 RX ORDER — SODIUM CHLORIDE 9 MG/ML
INJECTION, SOLUTION INTRAVENOUS ONCE
Status: DISCONTINUED | OUTPATIENT
Start: 2021-09-09 | End: 2021-09-10

## 2021-09-09 RX ORDER — IPRATROPIUM BROMIDE 42 UG/1
2 SPRAY, METERED NASAL 2 TIMES DAILY
Status: DISCONTINUED | OUTPATIENT
Start: 2021-09-09 | End: 2021-09-10

## 2021-09-09 NOTE — PROGRESS NOTES
NURSING ADMISSION NOTE      Patient admitted via Cart to room 505  Oriented to room. Safety precautions initiated. Bed in low position. Call light in reach. Obs radha complete with dtr Gaby Bell at bedside.  Gaby Bell spoke with Nargis ELLIS on phone and was pro

## 2021-09-09 NOTE — PROGRESS NOTES
Called by hospice RN from [address] [place] [address] and she stated pt would be ok to discharge tonight on hospice to home but family would not be able to get everything ready by 8:30pm tonight which is when she would have to leave by per hospice company.  RN from [facility] 946

## 2021-09-09 NOTE — PHYSICAL THERAPY NOTE
PHYSICAL THERAPY EVALUATION - INPATIENT     Room Number: 367/446-D  Evaluation Date: 9/9/2021  Type of Evaluation: Initial  Physician Order: PT Eval and Treat    Presenting Problem: progressive fatigue   Reason for Therapy: Mobility Dysfunction and D difficult to wake up and occasionally is resistive to assist.     SUBJECTIVE  Pt soloment, occasionally opens eyes throughout the session    Patient self-stated goal is not stated today     OBJECTIVE  Precautions: Bed/chair alarm  Fall Risk: High fall risk opening her eyes, mumbling, not following commands. Per daughter she is normally difficult to wake at times. Dependent assist transfer from supine to EOB. Increased alertness, eyes opening.  Pt intermittently assisting with trunk control to support in sitti above deficits to assist patient in returning to prior to level of function. DISCHARGE RECOMMENDATIONS  PT Discharge Recommendations: Sub-acute rehabilitation    PLAN  Patient has been evaluated and is being placed on hospice.   Patient will be discharged

## 2021-09-09 NOTE — CM/SW NOTE
Email sent to inpatient CM/SW re: pt's case. Also placed referral to [facility] - left detailed with Nargis with [facility] informing her of pt's case and to please contact Daughter Socorro Xavier.

## 2021-09-09 NOTE — CM/SW NOTE
Face sheet, IP transfer report, ER encounter summary, all ERCM notes and EDW Hospitalist H&P sent via [facility]. Only one SNF referral placed via [facility] to [facility] when [facility] was attempting to get patient from ER to SNF.  Inpatient CM/SW

## 2021-09-09 NOTE — CM/SW NOTE
Patient transferred to the floor. CORAL called [facility] 5th floor Charge RN @ [id number] and asked to speak with pt's daughter. ERCM spoke with pt's daughter Jose Armando Norris (pt's daughter Russel Becerra went home) - informed daughter Serge Amaya will fax EDW SNF list to her.  Daughter

## 2021-09-09 NOTE — PLAN OF CARE
PROBLEM: AMS/Agitation/Deconditioning    ASSESSMENT: Pt is A&Ox1, hx dementia. Pt is pleasantly confused at this time, hallucinating and picking at the air. VSS. [place] Cl[place] in place draining clear yellow urine.  Daughter at bedside, safety precautions in plac

## 2021-09-09 NOTE — CM/SW NOTE
Case discussed in rounds. Pt's family requesting hospice. CARLEY met with daughter at bedside who said that they have been in touch with [address] [place] [address] and would like to use this agency. Hospice order obtained.  CARLEY spoke with Kike Carmona at [address] [place] [address] ph#[phone]

## 2021-09-09 NOTE — PLAN OF CARE
Pt is alert to self, confused, has busy apron on lap. Daughter at bedside. On amcrest camera. Has been drowsy most of the day and not agitated. Pt is awake and eating lunch now with daughter. On RA. Lovenox. Purewick in place, pt incontinent.  PT attempted frequently for physical needs  - Identify cognitive and physical deficits and behaviors that affect risk of falls.   - Peel fall precautions as indicated by assessment.  - Educate pt/family on patient safety including physical limitations  - Instruct p

## 2021-09-09 NOTE — DISCHARGE SUMMARY
[facility] HOSPITAL[facility]  DISCHARGE SUMMARY     Bishop De Jesus Patient Status:  Observation    1932 MRN [id number]   [facility] 5NW-A Attending No att. providers found   [facility] Day # 0 PCP Julieta Cohn MD     Date of Admission: 2021 Wheezing. Refills: 0     ASPIRIN      Take 81 mg by mouth daily. Refills: 0     CITRACAL + D OR      Take by mouth 2 (two) times a day. Refills: 0     Claritin 10 MG Tabs  Generic drug: loratadine      Take 10 mg by mouth daily.    Refills: 0     Done

## 2021-09-09 NOTE — CM/SW NOTE
Rec'd call from Dr. Bufford Cushing that pt's Radha Morse is concerned with the required 14 day quarantine required at [facility] - per Dr. Bufford Cushing pt's daughter also discussing purchasing hospital bed and arranging caregivers at her home.  Informe

## 2021-09-09 NOTE — CM/SW NOTE
Matt'd call from Walter at [facility] that she spoke with her  and they are unable to take patient until patient is 24 hours free of receiving any IV Ativan for agitation.  Walter will stay in touch with EDW CM/CARLEY's tomorrow for update

## 2021-09-09 NOTE — H&P
[facility] HOSPITALIST  History and Physical     Samana Tavarez Patient Status:  Emergency    1932 MRN [id number]   [address] Attending Arabella Severs, MD   Hosp Day # 0 PCP Artem Mckeon MD     Chief Compla has never used smokeless tobacco. She reports that she does not drink alcohol and does not use drugs.     Family History:   Family History   Problem Relation Age of Onset   • Cancer Father         colon        Allergies:   Ibuprofen               RASH  Seas HPI.    Physical Exam:    BP 90/77   Pulse 75   Temp 97.7 °F (36.5 °C) (Temporal)   Resp 16   Ht 170.2 cm (5' 7\")   Wt 175 lb (79.4 kg)   SpO2 94%   BMI 27.41 kg/m²   General: No acute distress. HEENT: Normocephalic atraumatic. Moist mucous membranes. dementia v dehydration  2. CT head negative  3. UA negative  4. IVF x 1L  5. PT eval  6. May benefit from palliative care eval  2. Alzheimer's dementia with hallucinations and intermittent agitation  1. Recently taken off Aricept by family  2.  Given halluc

## 2021-09-09 NOTE — ED QUICK NOTES
Unable to obtain vitals due to agitation MD notified medication given. Pt is in [address].

## 2021-09-09 NOTE — PROGRESS NOTES
[facility] H[facility]PITAL[facility]  Progress Note     Anniemagno Stewartramon Patient Status:  Observation    1932 MRN [id number]   [facility] 5NW-A Attending Chioma Meier MD   Hosp Day # 0 PCP Chiqui Alanis MD     Chief Complaint: sleeping     S: [facility] last 168 hours. Cardiac  No results for input(s): TROP, PBNP in the last 168 hours. Creatinine Kinase  No results for input(s): CK in the last 168 hours. Inflammatory Markers  No results for input(s): CRP, KIKI, LDH, DDIMER in the last 168 hours.

## 2021-09-09 NOTE — PROGRESS NOTES
09/09/21 1414   Clinical Encounter Type   Visited With Health care provider  (TORI Shoemaker)   Routine Visit   (Responded to AD consult)   Identified that [facility] is placed on the patient's EMR. Also, the patient is under Palliative Care consult/Hospice.  Rola Aguila

## 2021-09-09 NOTE — CM/SW NOTE
Spoke with Walter at [facility] - she is inquiring about patient's agitation - CORAL spoke with ZACARIAS Hardy RN - per Modesta Staton patient is sundowning - patient is not trying to get OOB - she \"hits when the B/P cuff is pumping up and tells people to mary

## 2021-09-10 ENCOUNTER — TELEPHONE (OUTPATIENT)
Dept: FAMILY MEDICINE CLINIC | Facility: CLINIC | Age: 86
End: 2021-09-10

## 2021-09-10 VITALS
RESPIRATION RATE: 19 BRPM | SYSTOLIC BLOOD PRESSURE: 100 MMHG | HEIGHT: 67 IN | OXYGEN SATURATION: 91 % | BODY MASS INDEX: 26.73 KG/M2 | TEMPERATURE: 98 F | HEART RATE: 106 BPM | DIASTOLIC BLOOD PRESSURE: 67 MMHG | WEIGHT: 170.31 LBS

## 2021-09-10 PROCEDURE — 99217 OBSERVATION CARE DISCHARGE: CPT | Performed by: INTERNAL MEDICINE

## 2021-09-10 NOTE — PLAN OF CARE
PROBLEM: AMS/Unable to ambulate    ASSESSMENT: Pt is A&Ox1, hx dementia. PRN melatonin this shift for sleep, pt restless and picking at things, prn seroquel given later as patient trying to get out of bed. VSS, afebrile. Maintaining O2 sats >92% on RA.  Inc daily  - Provide frequent reorientation  - Promote wakefulness i.e. lights on, blinds open  - Promote sleep, encourage patient's normal rest cycle i.e. lights off, TV off, minimize noise and interruptions  - Encourage family to assist in orientation and pr

## 2021-09-10 NOTE — CM/SW NOTE
Per Aidee with [address] [place] [address] they will be admitting the pt into hospice today once she arrives home. Pt will need to have polst form completed here for the ambulance ride home. Per unit RN, dtr will be here this morning, SW will follow up.     Per Judy Parker

## 2021-09-10 NOTE — PLAN OF CARE
Pt alert to self, hx of dementia. VSS on RA. Lovenox. Busy apron on lap. Daughter at bedside. IV's patent and saline locked. Will DC to [facility] today, waiting on ambulance transport. Pt in bed with call light in place and bed alarm on.  Will continue to [facility] TV off, minimize noise and interruptions  - Encourage family to assist in orientation and promotion of home routines  Outcome: Adequate for Discharge

## 2021-09-10 NOTE — PROGRESS NOTES
NURSING DISCHARGE NOTE    Discharged Home via Ambulance. Accompanied by Family member  Belongings Taken by patient/family       Pt discharged via ambulance. Paperwork given to EMS. Daughter at bedside. All questions answered at this time.   IV's rem

## 2021-09-13 ENCOUNTER — TELEPHONE (OUTPATIENT)
Dept: FAMILY MEDICINE CLINIC | Facility: CLINIC | Age: 86
End: 2021-09-13

## 2021-09-13 NOTE — CM/SW NOTE
Received voicemail from patient's daughter, Jose Mcconnell about request for Digiting. Currently not happy with care from [address].   Call to [address] [place] [address] at [facility] with referral.  [facility] to follow up with patient a

## 2021-09-13 NOTE — TELEPHONE ENCOUNTER
Please see note below. **Order for Hospice pended. Please complete questions in order as appropriate. --no previous referral for hospice found in chart. Please route back to triage. Triage to fax.

## 2021-09-13 NOTE — TELEPHONE ENCOUNTER
[facility] calling requesting an order for a hospice evaluation be faxed over.     States that pt's family is wanting a second opinion from [facility] as they were unhappy with the way the previous hospice evaluation was conducted by a [facility]

## 2021-09-13 NOTE — CM/SW NOTE
09/13/21 0700   Discharge disposition   Expected discharge disposition Hospice/Home   Post Acute Care Provider Home   Additional Home Care/Hospice Provider [facility]  ([address])   Discharge transportation [facility]

## 2021-09-14 NOTE — TELEPHONE ENCOUNTER
Noted patient's family requested referral for different hospice company , the residential hosp[facility] referral was placed.

## 2021-09-15 ENCOUNTER — TELEPHONE (OUTPATIENT)
Dept: FAMILY MEDICINE CLINIC | Facility: CLINIC | Age: 86
End: 2021-09-15

## 2021-09-15 NOTE — TELEPHONE ENCOUNTER
Frida, hospice liaison from residential home health calling to let you know that pt was admitted to routine hospice at home yesterday kenia. Nothing needed on our end. She just wanted you aware.   Sending as [employer]

## 2022-01-14 DIAGNOSIS — J30.89 NON-SEASONAL ALLERGIC RHINITIS DUE TO OTHER ALLERGIC TRIGGER: ICD-10-CM

## 2022-01-17 RX ORDER — FLUTICASONE PROPIONATE 220 UG/1
AEROSOL, METERED RESPIRATORY (INHALATION)
Qty: 12 G | Refills: 0 | Status: SHIPPED | OUTPATIENT
Start: 2022-01-17

## 2022-01-20 ENCOUNTER — PATIENT MESSAGE (OUTPATIENT)
Dept: FAMILY MEDICINE CLINIC | Facility: CLINIC | Age: 87
End: 2022-01-20

## 2022-01-22 RX ORDER — IPRATROPIUM BROMIDE 42 UG/1
SPRAY, METERED NASAL
Qty: 15 ML | Refills: 0 | Status: SHIPPED | OUTPATIENT
Start: 2022-01-22

## 2022-01-25 NOTE — TELEPHONE ENCOUNTER
Please give this message to Kyrie Portillo. I am not sure if there is any way but it could be administered to the patient at home. Right now she under on hospice care.   Thank you

## 2022-01-25 NOTE — TELEPHONE ENCOUNTER
From: Elías Soto  To: Yoko Lou MD  Sent: 1/20/2022 12:25 PM CST  Subject: Flu shot for [employer] find anyone who will come in to home to give Flu shot for Tiarra. Both of us had boosters for Covad in Sept 2021.  [facility] nurse gave Laureen Barfield

## 2022-01-28 NOTE — TELEPHONE ENCOUNTER
Call to hattie/spouse-on hipaa-sts pt's hospice nurse is dewayne/[facility] hospice-contact # [phone].  [facility] nurse comes out to see pt frequently, confirms pt is unable to leave the house.  sts he has been repeatedly told by multiple sources that there

## 2022-01-31 NOTE — TELEPHONE ENCOUNTER
I followed up with [facility]. I spoke with Dominique Horvath, the supervisor. They are not providing any vaccinations. She said, the family is responsible for this. The family can arrange a clinic visit and/or [facility] for transportation.

## 2022-01-31 NOTE — TELEPHONE ENCOUNTER
Call to [facility]-advised of 1/28/22 attempt to obtain info for pt. She attempted to transfer me to dewayne RN-unsuccessful. sts she will email Lubna Raymundo w request to call me back. Provided our ofc  line #-to speak w me.  Emelyn Lebron voices unders

## 2022-01-31 NOTE — TELEPHONE ENCOUNTER
Call back from [address] does not provide immunizations. sts she advised pt/family to contact [facility] dept-sts did not provide them w any contact info. Advised I will contact health dept.      Call to [address]-

## 2022-02-01 NOTE — TELEPHONE ENCOUNTER
Saige Justice RN/clinical manager sts spoke w kristalirector/[facility]-was advised they do not administer immunizations in the home.   Dr Rhoda Morrison updated-requests we contact pt's daughter and advise of our attempts but found no organization/agency t

## 2022-04-09 RX ORDER — IPRATROPIUM BROMIDE 42 UG/1
SPRAY, METERED NASAL
Qty: 15 ML | Refills: 0 | Status: SHIPPED | OUTPATIENT
Start: 2022-04-09

## 2022-04-09 NOTE — TELEPHONE ENCOUNTER
LOV: 5/26/21 (med check)  Last Refill: 1/22/22, 15 mL, 0 RF  Next OV: N/A    Please authorize if acceptable. Thank you!

## 2022-04-12 NOTE — TELEPHONE ENCOUNTER
Per pt's spouse, pt is in hospice, for the past 6 months. Pt has advanced Alzheimers, and is unable to get up, get in a car or present herself for a visit.

## 2022-04-15 RX ORDER — SIMVASTATIN 20 MG
TABLET ORAL
Qty: 90 TABLET | Refills: 0 | Status: SHIPPED | OUTPATIENT
Start: 2022-04-15

## 2022-05-26 DIAGNOSIS — J30.89 NON-SEASONAL ALLERGIC RHINITIS DUE TO OTHER ALLERGIC TRIGGER: ICD-10-CM

## 2022-05-26 RX ORDER — FLUTICASONE PROPIONATE 220 UG/1
AEROSOL, METERED RESPIRATORY (INHALATION)
Qty: 12 G | Refills: 0 | Status: SHIPPED | OUTPATIENT
Start: 2022-05-26

## 2022-07-11 RX ORDER — IPRATROPIUM BROMIDE 42 UG/1
SPRAY, METERED NASAL
Qty: 15 ML | Refills: 0 | Status: SHIPPED | OUTPATIENT
Start: 2022-07-11

## 2022-08-22 ENCOUNTER — TELEPHONE (OUTPATIENT)
Dept: FAMILY MEDICINE CLINIC | Facility: CLINIC | Age: 87
End: 2022-08-22

## 2022-08-22 NOTE — TELEPHONE ENCOUNTER
Spoke with pts spouse and was advised that pt is not able to ambulate. Currently pt is on hospice at home.

## 2022-09-30 DIAGNOSIS — J30.89 NON-SEASONAL ALLERGIC RHINITIS DUE TO OTHER ALLERGIC TRIGGER: ICD-10-CM

## 2022-10-03 RX ORDER — FLUTICASONE PROPIONATE 220 UG/1
AEROSOL, METERED RESPIRATORY (INHALATION)
Qty: 12 G | Refills: 0 | OUTPATIENT
Start: 2022-10-03

## 2022-10-03 RX ORDER — IPRATROPIUM BROMIDE 42 UG/1
SPRAY, METERED NASAL
Qty: 15 ML | Refills: 0 | OUTPATIENT
Start: 2022-10-03

## 2022-10-04 DIAGNOSIS — J30.89 NON-SEASONAL ALLERGIC RHINITIS DUE TO OTHER ALLERGIC TRIGGER: ICD-10-CM

## 2022-10-07 RX ORDER — FLUTICASONE PROPIONATE 220 UG/1
AEROSOL, METERED RESPIRATORY (INHALATION)
Qty: 12 G | Refills: 0 | OUTPATIENT
Start: 2022-10-07

## 2022-10-07 RX ORDER — IPRATROPIUM BROMIDE 42 UG/1
SPRAY, METERED NASAL
Qty: 15 ML | Refills: 0 | OUTPATIENT
Start: 2022-10-07

## 2023-12-27 ENCOUNTER — TELEPHONE (OUTPATIENT)
Dept: FAMILY MEDICINE CLINIC | Facility: CLINIC | Age: 88
End: 2023-12-27

## 2023-12-27 ENCOUNTER — PATIENT OUTREACH (OUTPATIENT)
Dept: FAMILY MEDICINE CLINIC | Facility: CLINIC | Age: 88
End: 2023-12-27

## 2023-12-27 NOTE — PROGRESS NOTES
Called Pt spouse to schedule maw and was told that Pt has Alzheimer's and cannot walk. She will not be scheduling any future appts as she is in hospice care.

## 2024-01-17 ENCOUNTER — PATIENT OUTREACH (OUTPATIENT)
Dept: CASE MANAGEMENT | Age: 89
End: 2024-01-17

## 2024-01-17 ENCOUNTER — TELEPHONE (OUTPATIENT)
Dept: FAMILY MEDICINE CLINIC | Facility: CLINIC | Age: 89
End: 2024-01-17

## 2024-01-17 NOTE — PROCEDURES
The office order for PCP removal request is Denied and finalized on 2024.      Denied -  Patient:   Office must adhere to the tips and tricks “Documenting a Patient is ” process to armando chart appropriately.   Office must change patient status to  since no order is needed to remove a  patient from the PCP field.      Per Medicare eligibility in RTE, Medicare indicates patient is .  date is 2024        Thanks,  [facility] Team

## 2024-11-18 NOTE — TELEPHONE ENCOUNTER
"Urgent visit for cont back pain.  PT ( Adi Sánchez )recommend she come back for re-evaluation due to cont and increased low back pain with nerve pain down bilat leg x 1 week.    HPI - She started PT right after I saw her last time.  Starting last wk, she seems to be getting worse.  She has LBP \"and it's been making nerve pains going down both legs.  No numbness/tingling.  The nerve pain is \"random\" and lasts only a few seconds.  Walking and bending make it worse \"the only thing that's making it better is laying down and using heat\"  No other pain.  No swelling.  AM stiffness 30-45 min.  No CP, resp, or GI    PE  NAD  RRR no r/m/g  CTA  No edema  No synovitis  NT and no pain with ROM throughout  SLR neg    A/P - Pt with nonradiographic SpA and chronic back pain - PT increasing her pain, and she gets brief shooting pains down her legs but  no numbness/tingling.  The only think that helps her pain is lying down - this is not typical for a SpA and suggests an element of chronic pain/central pain sensitization  Check labs  Recommended pain mgmt - she will think about it  I also mentioned that there is now a rheumatologist much closer to her house (dr. Esquivel), and she will think about whether she wants to establish with her  " PT's daughter Bridgett Worthy called stating the pt. Came home today from the hospital via ambulance and was \"out\" all morning and during the ride home -- was given seroquel 25 mg at 1 am last night.   Bridgett Worthy was concerned she was not really arousable but her father

## (undated) DIAGNOSIS — E78.00 PURE HYPERCHOLESTEROLEMIA: ICD-10-CM

## (undated) NOTE — LETTER
[facility] [address], [address] [place] [address]    Consent for Anesthesia   1.    Neryjose de jesus Hill agree to be cared for by a physician anesthesiologist alone and/or with a nurse anesthetist, who is specially trained to monitor me and give me allergic reactions to medications, injury to my airway, heart, lungs, vision, nerves, or muscles and in extremely rare instances death. 5. My doctor has explained to me other choices available to me for my care (alternatives).   6. Pregnant Patients (“epid Printed: 2/25/2020 at 12:32 PM    Medical Record #: [id number]                                            Page 1 of 1

## (undated) NOTE — IP AVS SNAPSHOT
[address]            (For Outpatient Use Only) Initial Admit Date: 9/8/2021   Inpt/Obs Admit Date: Inpt: N/A / Obs: 09/09/21   Discharge Date:    Maged Olivares:  [de-identified]   MRN: [de-identified]   CSN: [id number]   CEID: [id number] Payor:  Plan:    Group Number:  Insurance Type:    Subscriber Name:  Subscriber :    Subscriber ID:  Pt Rel to Subscriber:    Hospital Account Financial Class: Medicare    September 10, 2021

## (undated) NOTE — ED AVS SNAPSHOT
Madison Parker   MRN: [id number]    Department:  [facility] Emergency Department   Date of Visit:  1/15/2020           Disclosure     Insurance plans vary and the physician(s) referred by the ER may not be covered by your plan.  Please contact you tell this physician (or your personal doctor if your instructions are to return to your personal doctor) about any new or lasting problems. The primary care or specialist physician will see patients referred from the [facility] Emergency Department.  Damir Ramires

## (undated) NOTE — IP AVS SNAPSHOT
Patient Demographics     Address  [address] [place] [address] Phone  [phone] (Home) *Preferred*  [phone] [facility]) E-mail Address  [email or web address]      Emergency Contact(s)     Name Relation Home Work [address] [facility]      2 sprays by Nasal route 2 (two) times a day.    Kole Eduardo MD   [    ]   [    ]   [    ]   [    ]     montelukast 10 MG Tabs  Commonly known as: SINGULAIR      TAKE 1 Luke Infante MD Note      H&P signed by Kelly Stokes DO at 9/9/2021 12:40 AM  Version 1 of 1    Author: Kelly Stokes DO Service: — Author Type: Physician    Filed: 9/9/2021 12:40 AM Date of Service: 9/8/2021 11:12 PM Status: Signed    : DO Sherice Cloud CHEMOTHERAPY  1991    left breast   • COLONOSCOPY     • MASTECTOMY LEFT  1991   • REMOVAL OF TONSILS,<13 Y/O      as a child   • TONSILLECTOMY         Social History:  reports that she has quit smoking. Her smoking use included cigarettes and cigars.  She h needed for Wheezing., Disp: , Rfl:   loratadine (CLARITIN) 10 MG Oral Tab, Take 10 mg by mouth daily. (Patient not taking: Reported on 9/8/2021 ), Disp: , Rfl:         Review of Systems:   A comprehensive 14 point review of systems was completed.     Pertin hours.    Creatinine Kinase  No results for input(s): CK in the last 168 hours. Inflammatory Markers  No results for input(s): CRP, KIKI, LDH, DDIMER in the last 168 hours. Imaging: Imaging data reviewed in Epic. ASSESSMENT / PLAN:     1Evan Nugent Planning    History related to current admission: Pt presented to ED 9/8 after family reported progressive fatigue[KM.1], more frequent agitation with yelling episodes[KM. 3] over the past 3 days. [KM.1]  PMHx includes dementia, asthma, DL.[KM.3]     Problem OBJECTIVE[KM. 1]  Precautions: Bed/chair alarm  Fall Risk: High fall risk[KM. 2]    WEIGHT BEARING RESTRICTION[KM. 1]  Weight Bearing Restriction: None[KM. 2]                PAIN ASSESSMENT[KM. 1]  Ratin  Location: denies pain[KM. 2]       COGNITION  · A difficult to wake at times. Dependent assist transfer from supine to EOB. Increased alertness, eyes opening. Pt intermittently assisting with trunk control to support in sitting. Hand over hand UE placement for trunk control.  Pt resistive, leaning back tow the above deficits to assist patient in returning to prior to level of function. DISCHARGE RECOMMENDATIONS[KM. 1]  PT Discharge Recommendations: Sub-acute rehabilitation[KM. 2]    PLAN[KM. 1]  Patient has been evaluated and is being placed on hospice.   Derek Patient's Short Term Goal:   9/9 admission:sleep, remain safe from fall and injury  9/9 AM: work with PT, go home  9/9 [address] Candel[address] Norris: Discharge tomorrow with hospice  9/10: discharge  Interventions:   - cluster care  -family at bedside  -fall precautions   -[facility]

## (undated) NOTE — MR AVS SNAPSHOT
[facility] [address], [address] [address] [phone]               Thank you for choosing us for your health care visit with Madeline Ahn MD.  We are glad to serve you and happy to provide you with this hodges Encounter for screening mammogram for breast cancer        General weakness        Acute pain of left knee          Instructions and Information about Your Health    Continue current meds. Watch diet for fats and carbs. Stay active.    Schedule physica 05/01/2014 42        EKG - covered if needed at Welcome to Medicare, and non-screening if indicated for medical reasons Electrocardiogram date Routine EKG is not a screening covered service except at the Welcome to Medicare Visit    Abdominal aortic aneury every 2 yrs up to age 79 or Yearly if High Risk   There are no preventive care reminders to display for this patient. Chlamydia  Annually if high risk No results found for: CHLAMYDIA No flowsheet data found.     Screening Mammogram      Mammogram    Rec Recommended Websites for Advanced Directives    Seek[employer].no. org/publications/Documents/personal_dec. pdf  An information packet, including necessary form from the [employer] website. http://www. [place]ph.state. [place]/public/books/advin Commonly known as:  SINGULAIR           predniSONE 20 MG Tabs   Take 1 tablet (20 mg total) by mouth daily.    Commonly known as:  DELTASONE           simvastatin 20 MG Tabs   TAKE ONE TABLET BY MOUTH NIGHTLY   Commonly known as:  ZOCOR           Tolterodin ? Keep a working flashlight near your bed. STAIRS:  ? Keep stairwells well lit with light switches at top and bottom. ? Install sturdy handrails on both sides. ? Make sure carpeting is secure. FLOORS:  ? Remove all loose wires, cords and throw rugs.   ? Start activities slowly and build up over time Do what you like   Get your heart pumping – brisk walking, biking, swimming Even 10 minute increments are effective and add up over the week   2 ½ hours per week – spread out over time Use a theo to keep you

## (undated) NOTE — Clinical Note
ASTHMA ACTION PLAN for Sam Tavarez     : 1932     Date: 2017  Provider:  Artem Mckeon MD  Phone for doctor or clinic: [place] [phone]    ACT Score: 25      You ca

## (undated) NOTE — MR AVS SNAPSHOT
[facility] [address], [address] [address] [phone]               Thank you for choosing us for your health care visit with Kole Eduardo MD.  We are glad to serve you and happy to provide you with this hodges azithromycin 250 MG Tabs   Take 2 tablets on day#1, then take 1 tablet daily from day#2-5. Commonly known as:  ZITHROMAX           CITRACAL + D OR   BID           CLARITIN 10 MG Tabs   Generic drug:  loratadine   Take 10 mg by mouth daily.            Sparkle Pedro discharge instructions in [employer] by going to Visits < Admission Summaries. If you've been to the Emergency Department or your doctor's office, you can view your past visit information in [employer] by going to Visits < Visit Summaries. iversity questions?

## (undated) NOTE — Clinical Note
FYI, TCM call made, see notes. NCM confirmed HFU appointment on 3/20/19 an changed visit type to TCM HFU.

## (undated) NOTE — MR AVS SNAPSHOT
[facility]  [facility], [address] [address] [phone]               Thank you for choosing us for your health care visit with Hardin Cooks, MD.  We are glad to serve you and happy to provide you with this hodges FISH OIL OR   1 TABLET DAILY           Fluticasone Propionate  MCG/ACT Aero   Inhale 1 puff into the lungs 2 (two) times daily.    Commonly known as:  FLOVENT HFA           Memantine HCl 10 MG Tabs   TAKE ONE TABLET BY MOUTH TWICE DAILY   Sunita